# Patient Record
Sex: FEMALE | Race: WHITE | NOT HISPANIC OR LATINO | Employment: FULL TIME | URBAN - METROPOLITAN AREA
[De-identification: names, ages, dates, MRNs, and addresses within clinical notes are randomized per-mention and may not be internally consistent; named-entity substitution may affect disease eponyms.]

---

## 2023-07-17 ENCOUNTER — OFFICE VISIT (OUTPATIENT)
Dept: PRIMARY CARE | Facility: CLINIC | Age: 43
End: 2023-07-17
Payer: COMMERCIAL

## 2023-07-17 VITALS
OXYGEN SATURATION: 98 % | SYSTOLIC BLOOD PRESSURE: 110 MMHG | HEART RATE: 76 BPM | WEIGHT: 197 LBS | RESPIRATION RATE: 16 BRPM | TEMPERATURE: 98.3 F | BODY MASS INDEX: 39.79 KG/M2 | DIASTOLIC BLOOD PRESSURE: 76 MMHG

## 2023-07-17 DIAGNOSIS — N61.1 BREAST ABSCESS: Primary | ICD-10-CM

## 2023-07-17 PROCEDURE — 99213 OFFICE O/P EST LOW 20 MIN: CPT | Performed by: FAMILY MEDICINE

## 2023-07-17 RX ORDER — FLUCONAZOLE 150 MG/1
150 TABLET ORAL ONCE
Qty: 2 TABLET | Refills: 0 | Status: SHIPPED | OUTPATIENT
Start: 2023-07-17 | End: 2023-07-17

## 2023-07-17 RX ORDER — SULFAMETHOXAZOLE AND TRIMETHOPRIM 800; 160 MG/1; MG/1
1 TABLET ORAL 2 TIMES DAILY
Qty: 20 TABLET | Refills: 0 | Status: SHIPPED | OUTPATIENT
Start: 2023-07-17 | End: 2023-07-27

## 2023-07-17 NOTE — PATIENT INSTRUCTIONS
Today we have addressed your breast abscess  I have prescribed abx and a diflucan should you develop a yeast infection.     Follow up if no improvement or if symptoms worsen.

## 2023-07-17 NOTE — PROGRESS NOTES
Subjective   Patient ID: Che Arcos is a 43 y.o. female who presents for Breast Problem (Abscess/Boil on right breast - onset months ago ).    She states that she has a boil that has been there for awhile.  She has had them in the past. It is not tender or painful.  There is some fluid coming from the abscess. She has tried hot compresses.  She isn't allergic to any antibiotics.  She is not nursing currently.  Last mammogram was about a year ago.      Breast Problem       Review of Systems    Objective   /76   Pulse 76   Temp 36.8 °C (98.3 °F)   Resp 16   Wt 89.4 kg (197 lb)   LMP 06/19/2023 (Approximate)   SpO2 98%   BMI 39.79 kg/m²     Physical Exam  Constitutional:       Appearance: Normal appearance.   Chest:          Comments: Small non draining fluid filled palpable cyst in the lower medial quadrant of the right breast  Neurological:      Mental Status: She is alert.         Assessment/Plan

## 2023-08-11 ENCOUNTER — OFFICE VISIT (OUTPATIENT)
Dept: PRIMARY CARE | Facility: CLINIC | Age: 43
End: 2023-08-11
Payer: COMMERCIAL

## 2023-08-11 VITALS
OXYGEN SATURATION: 98 % | TEMPERATURE: 98 F | DIASTOLIC BLOOD PRESSURE: 66 MMHG | SYSTOLIC BLOOD PRESSURE: 104 MMHG | RESPIRATION RATE: 16 BRPM | HEIGHT: 59 IN | HEART RATE: 93 BPM | BODY MASS INDEX: 39.31 KG/M2 | WEIGHT: 195 LBS

## 2023-08-11 DIAGNOSIS — R06.83 SNORING: ICD-10-CM

## 2023-08-11 DIAGNOSIS — Z12.31 BREAST CANCER SCREENING BY MAMMOGRAM: ICD-10-CM

## 2023-08-11 DIAGNOSIS — N60.01 BREAST CYST, RIGHT: ICD-10-CM

## 2023-08-11 DIAGNOSIS — Z12.4 CERVICAL CANCER SCREENING: ICD-10-CM

## 2023-08-11 DIAGNOSIS — Z00.00 HEALTHCARE MAINTENANCE: Primary | ICD-10-CM

## 2023-08-11 DIAGNOSIS — E66.9 OBESITY (BMI 35.0-39.9 WITHOUT COMORBIDITY): ICD-10-CM

## 2023-08-11 PROCEDURE — 4004F PT TOBACCO SCREEN RCVD TLK: CPT | Performed by: FAMILY MEDICINE

## 2023-08-11 PROCEDURE — 99213 OFFICE O/P EST LOW 20 MIN: CPT | Performed by: FAMILY MEDICINE

## 2023-08-11 PROCEDURE — 99396 PREV VISIT EST AGE 40-64: CPT | Performed by: FAMILY MEDICINE

## 2023-08-11 ASSESSMENT — PATIENT HEALTH QUESTIONNAIRE - PHQ9
SUM OF ALL RESPONSES TO PHQ9 QUESTIONS 1 AND 2: 0
2. FEELING DOWN, DEPRESSED OR HOPELESS: NOT AT ALL
1. LITTLE INTEREST OR PLEASURE IN DOING THINGS: NOT AT ALL

## 2023-08-11 ASSESSMENT — PROMIS GLOBAL HEALTH SCALE
RATE_PHYSICAL_HEALTH: VERY GOOD
RATE_AVERAGE_FATIGUE: MILD
RATE_SOCIAL_SATISFACTION: GOOD
RATE_AVERAGE_PAIN: 0
RATE_MENTAL_HEALTH: GOOD
CARRYOUT_PHYSICAL_ACTIVITIES: COMPLETELY
CARRYOUT_SOCIAL_ACTIVITIES: GOOD
RATE_QUALITY_OF_LIFE: VERY GOOD
EMOTIONAL_PROBLEMS: RARELY
RATE_GENERAL_HEALTH: VERY GOOD

## 2023-08-11 NOTE — PROGRESS NOTES
"Subjective   Patient ID: Che Arcos is a 43 y.o. female who presents for Annual Exam.    Dentist and Eye Doctor appointments: UTD in dentist/ eye   Immunizations: UTD  Diet: She does well and then at 6pm she snacks after dinner, she does eat during the day though.    Exercise: walking 2 miles 2 to 3 times a week  Supplements: None   Menstrual Cycles: Regular  Sexually Active: Yes, Male, vasectomy  Pregnancy History:   Cancer Screening:    Cervical: 2022 wnl, negative hpv   Breast: DUE   Colon: due next year    Skin:wears sunscreen   DEXA:N/A       HPI     Review of Systems    Objective   /66   Pulse 93   Temp 36.7 °C (98 °F)   Resp 16   Ht 1.499 m (4' 11\")   Wt 88.5 kg (195 lb)   LMP 2023 (Approximate) Comment: last pap 2022 PCP  SpO2 98%   BMI 39.39 kg/m²     Physical Exam  Constitutional:       General: She is not in acute distress.     Appearance: She is well-developed. She is not diaphoretic.   HENT:      Head: Normocephalic and atraumatic.      Right Ear: Tympanic membrane normal.      Left Ear: Tympanic membrane normal.      Nose: Nose normal.      Mouth/Throat:      Mouth: Mucous membranes are moist.   Eyes:      General: No scleral icterus.     Pupils: Pupils are equal, round, and reactive to light.   Neck:      Thyroid: No thyromegaly.      Vascular: No JVD.   Cardiovascular:      Rate and Rhythm: Normal rate and regular rhythm.      Heart sounds: Normal heart sounds. No murmur heard.     No friction rub. No gallop.   Pulmonary:      Effort: Pulmonary effort is normal. No respiratory distress.      Breath sounds: Normal breath sounds. No wheezing or rales.   Chest:      Chest wall: No tenderness.   Abdominal:      General: Bowel sounds are normal. There is no distension.      Palpations: Abdomen is soft. There is no mass.      Tenderness: There is no abdominal tenderness. There is no rebound.   Musculoskeletal:         General: Normal range of motion.      Cervical back: " Normal range of motion and neck supple.   Lymphadenopathy:      Cervical: No cervical adenopathy.   Skin:     General: Skin is warm and dry.   Neurological:      General: No focal deficit present.      Mental Status: She is alert and oriented to person, place, and time.      Deep Tendon Reflexes: Reflexes normal.   Psychiatric:         Mood and Affect: Mood normal.         Behavior: Behavior normal.         Assessment/Plan   Diagnoses and all orders for this visit:  Healthcare maintenance  -     CBC; Future  -     Comprehensive Metabolic Panel; Future  -     Lipid Panel; Future  -     Vitamin D 25 hydroxy; Future  -     TSH with reflex to Free T4 if abnormal; Future  Cervical cancer screening  Comments:  5/2022- wnl,negative hpv  Breast cancer screening by mammogram  -     BI mammo bilateral screening tomosynthesis; Future  Snoring  -     Referral to Adult Sleep Medicine; Future  Breast cyst, right  -     Referral to Breast Surgery; Future  Obesity (BMI 35.0-39.9 without comorbidity)  -     Referral to Comprehensive Weight Loss; Future  -     Referral to Nutrition Services; Future

## 2023-08-11 NOTE — PATIENT INSTRUCTIONS
Today we performed your Annual Physical Exam.  Your preventative health care, labs and vaccinations have been reviewed and are up to date.      Your vaccines are up to date.    I have referred you to sleep medicine for your snoring/sleep study    I have referred you to comprehensive weight loss/nutrition for healthy eating habits and weight loss    For your breast cyst I have referred you to a breast surgeon    Follow up in 1 year for your Complete Physical Exam

## 2023-09-25 PROBLEM — E04.1 THYROID NODULE: Status: ACTIVE | Noted: 2018-12-07

## 2023-09-25 PROBLEM — R63.5 WEIGHT GAIN: Status: ACTIVE | Noted: 2023-09-25

## 2023-09-25 PROBLEM — E55.9 HYPOVITAMINOSIS D: Status: ACTIVE | Noted: 2023-09-25

## 2023-09-25 PROBLEM — Z12.4 PAP SMEAR FOR CERVICAL CANCER SCREENING: Status: ACTIVE | Noted: 2023-09-25

## 2023-09-25 PROBLEM — L72.3 SEBACEOUS CYST: Status: ACTIVE | Noted: 2023-09-25

## 2023-10-02 ENCOUNTER — OFFICE VISIT (OUTPATIENT)
Dept: SURGICAL ONCOLOGY | Facility: CLINIC | Age: 43
End: 2023-10-02
Payer: COMMERCIAL

## 2023-10-02 VITALS
HEIGHT: 59 IN | SYSTOLIC BLOOD PRESSURE: 111 MMHG | WEIGHT: 195 LBS | HEART RATE: 83 BPM | DIASTOLIC BLOOD PRESSURE: 85 MMHG | BODY MASS INDEX: 39.31 KG/M2

## 2023-10-02 DIAGNOSIS — L72.3 SEBACEOUS CYST: Primary | ICD-10-CM

## 2023-10-02 PROCEDURE — 4004F PT TOBACCO SCREEN RCVD TLK: CPT | Performed by: SURGERY

## 2023-10-02 PROCEDURE — 99203 OFFICE O/P NEW LOW 30 MIN: CPT | Performed by: SURGERY

## 2023-10-02 PROCEDURE — 99213 OFFICE O/P EST LOW 20 MIN: CPT | Performed by: SURGERY

## 2023-10-02 NOTE — H&P (VIEW-ONLY)
BREAST SURGERY NEW PATIENT    Subjective   Che Arcos is a 43 y.o. female who presents for  sebaceous cyst of the right breast skin.    Mass palpated on clinical breast exam prompting diagnostic imaging.    Bilateral mammogram and right breast U/S on 9/8/2023 reveal a benign sebaceous cyst at 4:00 10cmFN measuring 1.3cm.    She notes recurrent drainage from the area for the past 6 months.    Menarche: 10  AFLB: 30  Menopause: no  HRT: no  Previous biopsies: No  Previous breast surgery: Yes - bilateral breast reduction  Prior breast cancer: No    Family history: negative.    Review of Systems   Constitutional symptoms: Denies generalized fatigue.  Denies weight change, fevers/chills, difficulty sleeping   Eyes: Denies double vision, glaucoma, cataracts.  Ear/nose/throat/mouth: Denies hearing changes, sore throat, sinus problems.  Cardiovascular: No chest pain.  Denies irregular heartbeat.  Denies ankle swelling.  Respiratory: No wheezing, cough, or shortness of breath.  Gastrointestinal: No abdominal pain,  No nausea/vomiting.  No indigestion/heartburn.  No change in bowel habits.  No constipation or diarrhea.   Genitourinary: No urinary incontinence.  No urinary frequency.  No painful urination.  Musculoskeletal: No bone pain, no muscle pain, no joint pain.   Integumentary: No rash. No masses.  No changes in moles.  No easy bruising.  Neurological: No headaches.  No tremors. No numbness/tingling.  Psychiatric: No anxiety. No depression.  Endocrine: No excessive thirst.  Not too hot or too cold.  Not tired or fatigued.    Hematological/lymphatic: No swollen glands or blood clotting problems.  No bruising.    Objective   Physical Exam  General: Alert and oriented x 3.  Mood and affect are appropriate.  HEENT: EOMI, PERRLA.   Neck: supple, no masses, no cervical adenopathy.  Cardiovascular: no lower extremity edema.  Pulmonary: breathing non labored on room air.  GI: Abdomen soft, no masses. No hepatomegaly or  splenomegaly.  Lymph nodes: No supraclavicular or axillary adenopathy bilaterally.  Musculoskeletal: Full range of motion in the upper extremities bilaterally.  Neuro: denies dizziness, tremors    Breasts: The breasts were examined in both the seated and supine positions.    RIGHT: The nipple is everted without nipple discharge.  There are no skin changes, skin thickening, or dimpling. There are 2 adjacent masses just superior to the reduction pattern IMF incision.  One lesion does have some overlying redness the more medial mass does not.     LEFT: The nipple is everted without nipple discharge.  There are no skin changes, skin thickening, or dimpling. There are no masses palpated in the LEFT breast.     Radiology review: All images and reports were personally reviewed.     Assessment/Plan     Right breast sebaceous cyst at 4:00 10cmFN measuring 1.3cm  Surgical excision can be offered if symptomatic or recurring infections.  Given recurring symptoms I do think she would benefit from surgical excision.   Offered surgical date of 10/20/2023 for right breast excisional biopsy.          Ynes Benavides, DO

## 2023-10-04 ENCOUNTER — NUTRITION (OUTPATIENT)
Dept: PRIMARY CARE | Facility: CLINIC | Age: 43
End: 2023-10-04
Payer: COMMERCIAL

## 2023-10-04 DIAGNOSIS — E66.9 OBESITY (BMI 35.0-39.9 WITHOUT COMORBIDITY): Primary | ICD-10-CM

## 2023-10-04 PROCEDURE — 97802 MEDICAL NUTRITION INDIV IN: CPT | Performed by: DIETITIAN, REGISTERED

## 2023-10-04 NOTE — PROGRESS NOTES
Assessment     Reason for Visit:  Che Arcos is a 43 y.o. female who presents for Nutrition Counseling and Weight Management    Food And Nutrient Intake:  Food and Nutrient History  Food and Nutrient History: Patient is here to discuss weight loss. She feels that her breakfast and lunches at home are healthy, but later in the day her eating habits are not as balanced. She lives with 3 children and her . 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  Fluid Intake: coffee in the morning and an iced coffee in the afternoon, water w/ flavor, sometimes soda w/ fast food. Wine, sometimes a few glasses.  Food Allergy: none  Food Intolerance: greens (spring mix, spinach) although it doesn't deter her from eating it  GI Symptoms: none  Oral Problems: denies  Dentition: own  Sleep Duration/Quality:  (sleep study pending)     Food Intake  Meal 1: B: coffee w/ half and half, 1-1.5 hours later a couple eggs, [employer]  Meal 2: L: soup, salad or frozen  [place]'s meal when at home. Fast food when she's traveling mid-day, she would rather not make this choice.  Meal 3: D: the kids have certain meals they like - tacos, spaghetti & meatballs, noodles, ground turkey. She prefers more diversity of meals, so sometimes she doesn't eat dinner. She recently bought a [employer] cookbook to try new recipes. Sometimes has wine with/instead of dinner.  Snacks: pm S: chips, popcorn, cheese. Sometimes later at night eats a snack if she didn't eat dinner, but she denies that eating in the evening is problematic.    Food Preparation  Cooking: Patient  Grocery Shopping: Patient    Food And Nutrient Administration:    Diet Experience  Previous Diet / Nutrition Education / Counseling: She lost 30# about 7 years ago when following a 1200 kcal diet and going to [facility]. She had more time available at that time, could drop the kids at the  childcare center at the gym.  Dieting Attempts: She reports she has been on a variety of diets over the years, none of which were sustainable long-term.    Eating Environment  Atmosphere:  (Dinners are hectic, not very relaxing due to lack of time since the kids have activities)    Physical Activities:  Physical Activity  Physical Activity History: [place] about 7 years ago, she liked it at the time.  Consistency: No  Type of Physical Activity: She goes for walks and uses [employer] bike, both are intermittent because lack of time is a barrier to exercise. She doesn't enjoy the bike, she prefers walking. She is interested in becoming more physically active.    Knowledge Beliefs Attitudes and Behavior  Food and Nutrition Knowledge  Area(s) and Level of Knowledge/Skill: She has learned about nutrition from prior diets, also has seen ideas on TikTok    Avoidance Behavior  Avoidance: Specific foods  Cause of Avoidance Behavior: She avoids some foods because she feels that they are bad for her, or she intermittently eats them but feels negative feelings about choosing them, such as chips or a high-fat yogurt.    Diagnosis   Malnutrition Diagnosis  Patient has Malnutrition Diagnosis: No  Nutrition Diagnosis  Patient has Nutrition Diagnosis: Yes  Diagnosis Status (1): New  Nutrition Diagnosis 1: Food and nutrition related knowledge deficit  Related to (1): exposure to a variety of nutrition-related education but she is not sure which rules are worthy of following  As Evidenced by (1): patient has questions about changing her diet to promote weight loss  Additional Nutrition Diagnosis: Diagnosis 2  Diagnosis Status (2): New  Nutrition Diagnosis 2: Physicial inactivity  Related to (2): lack of time, and perhaps other reasons such as lack of positive feedback from her body when she does choose to exercise  As Evidenced by (2): she does not currently have a regular routine of being physically active    Interventions/Recommendations    Food and Nutrition Delivery  Meals & Snacks: Modify Composition of Meals/Snacks, Modify schedule of foods/fluids, Other, specify: (work on improving relationship with food/consider IE)  Nutrition Education  Nutrition Education Content: Content related nutrition education, Physical activity guidance  Nutrition Counseling  Theoretical Basis/Approach: Nutrition counseling based on health belief model  Goals: HBM: explored perceived barriers/benefits of exercise.    Patient Instructions   1. Encouraged patient to pursue balanced eating.   Continue to start the day with a meal. Be attentive to the first time of the day that there are signs of hunger appearing, and respond to those hunger signals by eating a meal or snack.   Eat food at least every 5 hours. If it will be longer than 5 hours between meals, consider adding a snack between the meals. Eating at regular intervals can help prevent becoming overly hungry.   Use the Plate Method to balance the type of food and amount of foods on the plate.   At snacks, include a food that is a rich source of protein, and include a food from a second food group. Eating food from 2 food groups at a snack can promote more satisfaction than eating a snack that is very small. A snack should be satisfying and help diminish thoughts of hunger until the next meal.   Spend some time each week planning out what to eat, shop for groceries, and do a little food preparation. Try to create meals that will yield leftovers to save time at another meal. Planning can be one of the most helpful skills for promoting balanced eating.    Strive to make pleasure part of eating healthy. Also strive for a good relationship with food. This shouldn't be a temporary diet that can only be maintained with strict willpower. Be curious about what tastes good to you and what foods feel good in your body. No food is inherently good or bad, and eating shouldn't promote negative feelings about food. Be kind to  yourself as you work on balanced eating.    2. Utilize the Plate Method at meals in order to balance meals.   - 1/3-1/2 of the plate full of non-starchy vegetables. These foods contribute very little carbohydrate to the plate, and they add fiber to the meal. Salad, carrots, bell peppers, zucchini, broccoli, cauliflower, asparagus, radishes, carrots and green beans are a few examples of these foods. They can be served cooked or raw.    - 1/4-1/3 of the plate including protein foods. Examples can include meat, nuts, seeds, cheese, cottage cheese, nut butter, fish, seafood, tofu, and edamame.     - 1/4-1/3 of the plate including carbohydrate foods. These foods include grains, fruit, legumes, starchy vegetables, milk and yogurt. Aim to eat at least half of the daily grains as whole grains.     3. Introduced patient to the concept of Intuitive Eating. Explained that diet culture generates food rules that are not helpful at guiding decisions about balanced eating. Food rules from diet culture are not typically realistic to follow long-term and they can promote a negative relationship with food. Encouraged patient to consider becoming more attuned to body signals about hunger, and to respond to hunger by choosing foods that are likely to satisfy. Encouraged eating meals at regular intervals through the day, and creating meals that include foods from a diversity of food groups.     4. Explored the idea of exercise with her, talked about prior exercise experiences and encouraged her to think about what type of exercises she might enjoy at this point in her life. Encouraged her to try out being physically active in small ways, such as getting a 15-20 minute walk on a regular basis. Also emphasized there is a large diversity of types of exercise, so she should try out what type might resonate most with her. She has been thinking about lifting weights - encouraged her to follow through with this idea.     Monitoring and  "Evaluation   Food/Nutrient Related History Monitoring  Monitoring and Evaluation Plan: Amount of food, Meal/snack pattern  Amount of Food: Types of food, Estimated amout of food  Criteria: She will use the Plate Method to guide amount/type of food at meals  Meal/Snack Pattern: Estimated meal and snack pattern  Criteria: She will practice packing snack/wrap/meal to try pre-emptively eating so that she doesn't become overly hungry mid-day and purchase fast food  Knowledge/Beliefs/Attitudes  Monitoring and Evaluation Plan: Beliefs and attitudes, Avoidance behavior  Criteria: She will begin trying to evaluate whether \"food rules\" are serving her well or are barriers to eating with a good relationship to food.  Avoidance behavior: Avoidance  Criteria: She will challenge some of her ideas about foods that she should be avoiding, and see what the experiences are like to try eating them    Time Spent  Prep time on day of patient encounter: 5 minutes  Time spent directly with patient, family or caregiver: 60 minutes  Additional Time Spent on Patient Care Activities: 0 minutes  Documentation Time: 20 minutes  Other Time Spent: 0 minutes  Total: 85 minutes      Readiness to Change : Good  Level of Understanding : Good  Anticipated Compliant : Good    "

## 2023-10-04 NOTE — PATIENT INSTRUCTIONS
1. Encouraged patient to pursue balanced eating.   Continue to start the day with a meal. Be attentive to the first time of the day that there are signs of hunger appearing, and respond to those hunger signals by eating a meal or snack.   Eat food at least every 5 hours. If it will be longer than 5 hours between meals, consider adding a snack between the meals. Eating at regular intervals can help prevent becoming overly hungry.   Use the Plate Method to balance the type of food and amount of foods on the plate.   At snacks, include a food that is a rich source of protein, and include a food from a second food group. Eating food from 2 food groups at a snack can promote more satisfaction than eating a snack that is very small. A snack should be satisfying and help diminish thoughts of hunger until the next meal.   Spend some time each week planning out what to eat, shop for groceries, and do a little food preparation. Try to create meals that will yield leftovers to save time at another meal. Planning can be one of the most helpful skills for promoting balanced eating.    Strive to make pleasure part of eating healthy. Also strive for a good relationship with food. This shouldn't be a temporary diet that can only be maintained with strict willpower. Be curious about what tastes good to you and what foods feel good in your body. No food is inherently good or bad, and eating shouldn't promote negative feelings about food. Be kind to yourself as you work on balanced eating.    2. Utilize the Plate Method at meals in order to balance meals.   - 1/3-1/2 of the plate full of non-starchy vegetables. These foods contribute very little carbohydrate to the plate, and they add fiber to the meal. Salad, carrots, bell peppers, zucchini, broccoli, cauliflower, asparagus, radishes, carrots and green beans are a few examples of these foods. They can be served cooked or raw.    - 1/4-1/3 of the plate including protein foods.  Examples can include meat, nuts, seeds, cheese, cottage cheese, nut butter, fish, seafood, tofu, and edamame.     - 1/4-1/3 of the plate including carbohydrate foods. These foods include grains, fruit, legumes, starchy vegetables, milk and yogurt. Aim to eat at least half of the daily grains as whole grains.     3. Introduced patient to the concept of Intuitive Eating. Explained that diet culture generates food rules that are not helpful at guiding decisions about balanced eating. Food rules from diet culture are not typically realistic to follow long-term and they can promote a negative relationship with food. Encouraged patient to consider becoming more attuned to body signals about hunger, and to respond to hunger by choosing foods that are likely to satisfy. Encouraged eating meals at regular intervals through the day, and creating meals that include foods from a diversity of food groups.     4. Explored the idea of exercise with her, talked about prior exercise experiences and encouraged her to think about what type of exercises she might enjoy at this point in her life. Encouraged her to try out being physically active in small ways, such as getting a 15-20 minute walk on a regular basis. Also emphasized there is a large diversity of types of exercise, so she should try out what type might resonate most with her. She has been thinking about lifting weights - encouraged her to follow through with this idea.

## 2023-10-05 ENCOUNTER — APPOINTMENT (OUTPATIENT)
Dept: PREADMISSION TESTING | Facility: HOSPITAL | Age: 43
End: 2023-10-05
Payer: COMMERCIAL

## 2023-10-06 ENCOUNTER — APPOINTMENT (OUTPATIENT)
Dept: PREADMISSION TESTING | Facility: HOSPITAL | Age: 43
End: 2023-10-06
Payer: COMMERCIAL

## 2023-10-12 ENCOUNTER — CLINICAL SUPPORT (OUTPATIENT)
Dept: PREADMISSION TESTING | Facility: HOSPITAL | Age: 43
End: 2023-10-12
Payer: COMMERCIAL

## 2023-10-12 ENCOUNTER — LAB (OUTPATIENT)
Dept: LAB | Facility: LAB | Age: 43
End: 2023-10-12
Payer: COMMERCIAL

## 2023-10-12 VITALS
DIASTOLIC BLOOD PRESSURE: 69 MMHG | HEIGHT: 59 IN | BODY MASS INDEX: 40.31 KG/M2 | SYSTOLIC BLOOD PRESSURE: 116 MMHG | RESPIRATION RATE: 16 BRPM | WEIGHT: 199.96 LBS | HEART RATE: 64 BPM | TEMPERATURE: 97 F

## 2023-10-12 DIAGNOSIS — Z01.818 PRE-OP TESTING: Primary | ICD-10-CM

## 2023-10-12 DIAGNOSIS — L72.3 SEBACEOUS CYST: ICD-10-CM

## 2023-10-12 DIAGNOSIS — N60.81 SEBACEOUS CYST OF SKIN OF RIGHT BREAST: ICD-10-CM

## 2023-10-12 LAB
ALBUMIN SERPL BCP-MCNC: 4.3 G/DL (ref 3.4–5)
ALP SERPL-CCNC: 60 U/L (ref 33–110)
ALT SERPL W P-5'-P-CCNC: 13 U/L (ref 7–45)
ANION GAP SERPL CALC-SCNC: 10 MMOL/L (ref 10–20)
AST SERPL W P-5'-P-CCNC: 14 U/L (ref 9–39)
BILIRUB SERPL-MCNC: 0.4 MG/DL (ref 0–1.2)
BUN SERPL-MCNC: 12 MG/DL (ref 6–23)
CALCIUM SERPL-MCNC: 9 MG/DL (ref 8.6–10.3)
CHLORIDE SERPL-SCNC: 103 MMOL/L (ref 98–107)
CO2 SERPL-SCNC: 25 MMOL/L (ref 21–32)
CREAT SERPL-MCNC: 0.8 MG/DL (ref 0.5–1.05)
ERYTHROCYTE [DISTWIDTH] IN BLOOD BY AUTOMATED COUNT: 12.6 % (ref 11.5–14.5)
GFR SERPL CREATININE-BSD FRML MDRD: >90 ML/MIN/1.73M*2
GLUCOSE SERPL-MCNC: 97 MG/DL (ref 74–99)
HCT VFR BLD AUTO: 42.4 % (ref 36–46)
HGB BLD-MCNC: 13.9 G/DL (ref 12–16)
MCH RBC QN AUTO: 30.5 PG (ref 26–34)
MCHC RBC AUTO-ENTMCNC: 32.8 G/DL (ref 32–36)
MCV RBC AUTO: 93 FL (ref 80–100)
NRBC BLD-RTO: 0 /100 WBCS (ref 0–0)
PLATELET # BLD AUTO: 271 X10*3/UL (ref 150–450)
PMV BLD AUTO: 11.1 FL (ref 7.5–11.5)
POTASSIUM SERPL-SCNC: 4.3 MMOL/L (ref 3.5–5.3)
PROT SERPL-MCNC: 6.8 G/DL (ref 6.4–8.2)
RBC # BLD AUTO: 4.55 X10*6/UL (ref 4–5.2)
SODIUM SERPL-SCNC: 134 MMOL/L (ref 136–145)
WBC # BLD AUTO: 7.5 X10*3/UL (ref 4.4–11.3)

## 2023-10-12 PROCEDURE — 80053 COMPREHEN METABOLIC PANEL: CPT

## 2023-10-12 PROCEDURE — 36415 COLL VENOUS BLD VENIPUNCTURE: CPT

## 2023-10-12 PROCEDURE — 99202 OFFICE O/P NEW SF 15 MIN: CPT | Performed by: NURSE PRACTITIONER

## 2023-10-12 PROCEDURE — 85027 COMPLETE CBC AUTOMATED: CPT

## 2023-10-12 ASSESSMENT — PAIN SCALES - GENERAL: PAINLEVEL_OUTOF10: 0 - NO PAIN

## 2023-10-12 ASSESSMENT — ENCOUNTER SYMPTOMS
PSYCHIATRIC NEGATIVE: 1
RESPIRATORY NEGATIVE: 1
ROS SKIN COMMENTS: SEE HPI
HEMATOLOGIC/LYMPHATIC NEGATIVE: 1
NEUROLOGICAL NEGATIVE: 1
EYES NEGATIVE: 1
MUSCULOSKELETAL NEGATIVE: 1
CARDIOVASCULAR NEGATIVE: 1
GASTROINTESTINAL NEGATIVE: 1
ENDOCRINE NEGATIVE: 1
CONSTITUTIONAL NEGATIVE: 1

## 2023-10-12 ASSESSMENT — DUKE ACTIVITY SCORE INDEX (DASI)
CAN YOU WALK INDOORS, SUCH AS AROUND YOUR HOUSE: YES
CAN YOU PARTICIPATE IN STRENOUS SPORTS LIKE SWIMMING, SINGLES TENNIS, FOOTBALL, BASKETBALL, OR SKIING: NO
CAN YOU DO YARD WORK LIKE RAKING LEAVES, WEEDING OR PUSHING A MOWER: YES
CAN YOU WALK A BLOCK OR TWO ON LEVEL GROUND: YES
CAN YOU PARTICIPATE IN MODERATE RECREATIONAL ACTIVITIES LIKE GOLF, BOWLING, DANCING, DOUBLES TENNIS OR THROWING A BASEBALL OR FOOTBALL: YES
CAN YOU DO LIGHT WORK AROUND THE HOUSE LIKE DUSTING OR WASHING DISHES: YES
CAN YOU DO HEAVY WORK AROUND THE HOUSE LIKE SCRUBBING FLOORS OR LIFTING AND MOVING HEAVY FURNITURE: YES
CAN YOU TAKE CARE OF YOURSELF (EAT, DRESS, BATHE, OR USE TOILET): YES
TOTAL_SCORE: 50.7
CAN YOU CLIMB A FLIGHT OF STAIRS OR WALK UP A HILL: YES
CAN YOU DO MODERATE WORK AROUND THE HOUSE LIKE VACUUMING, SWEEPING FLOORS OR CARRYING GROCERIES: YES
CAN YOU HAVE SEXUAL RELATIONS: YES
DASI METS SCORE: 9
CAN YOU RUN A SHORT DISTANCE: YES

## 2023-10-12 ASSESSMENT — PAIN - FUNCTIONAL ASSESSMENT: PAIN_FUNCTIONAL_ASSESSMENT: 0-10

## 2023-10-12 ASSESSMENT — CHADS2 SCORE
CHADS2 SCORE: 0
AGE GREATER THAN OR EQUAL TO 75: NO
PRIOR STROKE OR TIA OR THROMBOEMBOLISM: NO
CHF: NO
DIABETES: NO
HYPERTENSION: NO

## 2023-10-12 ASSESSMENT — ACTIVITIES OF DAILY LIVING (ADL): ADL_SCORE: 0

## 2023-10-12 NOTE — CPM/PAT H&P
CPM/PAT Evaluation       Name: Che Arcos (Che Arcos)  /Age: 1980/43 y.o.     In-Person       Chief Complaint: sebaceous cyst    HPI This is a 44 yo with complaint of right breast sebaceous cyst.  She had completed a course of antibiotic therapy over the summer.  The cyst is still present and draining scant serosanguinous drainage.  Pt denies recent fever or chills.    Past Medical History:   Diagnosis Date    Anxiety     Sebaceous cyst of breast        Past Surgical History:   Procedure Laterality Date    BREAST SURGERY  2006    Reduction     SECTION, LOW TRANSVERSE  7/25/14    x2       Patient  reports being sexually active and has had partner(s) who are male. She reports using the following method of birth control/protection: Male Sterilization.    Family History   Problem Relation Name Age of Onset    Depression Mother Erica Aj     Hearing loss Mother Erica Aj     Hyperlipidemia Mother Erica Aj     No Known Problems Father      No Known Problems Brother      No Known Problems Daughter      No Known Problems Son      No Known Problems Son         No Known Allergies    Prior to Admission medications    Not on File        Review of Systems   Constitutional: Negative.    HENT: Negative.     Eyes: Negative.    Respiratory: Negative.     Cardiovascular: Negative.    Gastrointestinal: Negative.    Endocrine: Negative.    Genitourinary: Negative.    Musculoskeletal: Negative.    Skin:         See HPI   Neurological: Negative.    Hematological: Negative.    Psychiatric/Behavioral: Negative.          Physical Exam  Constitutional:       Appearance: Normal appearance.   HENT:      Head: Normocephalic and atraumatic.      Nose: Nose normal.      Mouth/Throat:      Mouth: Mucous membranes are moist.   Eyes:      Pupils: Pupils are equal, round, and reactive to light.   Cardiovascular:      Rate and Rhythm: Normal rate and regular rhythm.   Pulmonary:      Effort: Pulmonary effort is normal.       Breath sounds: Normal breath sounds.   Abdominal:      General: Bowel sounds are normal.      Palpations: Abdomen is soft.   Musculoskeletal:         General: Normal range of motion.      Cervical back: Normal range of motion.   Skin:     General: Skin is warm and dry.   Neurological:      General: No focal deficit present.      Mental Status: She is alert and oriented to person, place, and time.   Psychiatric:         Mood and Affect: Mood normal.         Behavior: Behavior normal.      Patient denies any anesthesia complications.    PAT AIRWAY:   Airway:     Neck ROM::  Full  Teeth intact    Visit Vitals  /69   Pulse 64   Temp 36.1 °C (97 °F) (Temporal)   Resp 16     Lab Results   Component Value Date    GLUCOSE 97 10/12/2023    CALCIUM 9.0 10/12/2023     (L) 10/12/2023    K 4.3 10/12/2023    CO2 25 10/12/2023     10/12/2023    BUN 12 10/12/2023    CREATININE 0.80 10/12/2023      Lab Results   Component Value Date    WBC 7.5 10/12/2023    HGB 13.9 10/12/2023    HCT 42.4 10/12/2023    MCV 93 10/12/2023     10/12/2023      DASI Risk Score    No data to display       Caprini DVT Assessment    No data to display       Modified Frailty Index    No data to display       CHADS2 Stroke Risk  Current as of a minute ago        N/A 3 - 100%: High Risk   2 - 3%: Medium Risk   0 - 2%: Low Risk     Last Change: N/A          This score determines the patient's risk of having a stroke if the patient has atrial fibrillation.        This score is not applicable to this patient. Components are not calculated.          Revised Cardiac Risk Index    No data to display       Apfel Simplified Score    No data to display       Risk Analysis Index Results This Encounter    No data found in the last 1 encounters.         Assessment and Plan:     Plan for OR on 10/20  Urine HCG  CMP  CBC

## 2023-10-12 NOTE — PREPROCEDURE INSTRUCTIONS
Medication List      as of October 12, 2023  1:56 PM     You have not been prescribed any medications.         PRE-OPERATIVE INSTRUCTIONS    You will receive notification one business day prior to your surgery to confirm your arrival time and additional information. It is important that you answer your phone and/or check your messages during this time.    Please enter the building through the Outpatient entrance. Take the elevator off the lobby to the 2nd floor and check in at the Outpatient Surgery desk    INSTRUCTIONS:  Talk to your surgeon for instructions if you should stop your aspirin, blood thinner, or diabetes medicines.  DO NOT take any multivitamins or over the counter supplements for 7-10 days before surgery.  If not being admitted, you must have an adult immediately available to drive you home after surgery. We also highly recommend you have someone stay with you for the entire day and night of your surgery.  For children having surgery, a parent or legal guardian must accompany t hem to the surgery center. If this is not possible, please call [phone] to make additional arrangements.  For adults who are unable to consent or make medical decisions for themselves, a legal guardian or Power of  must accompany them to the surgery center. If this is not possible, please call [phone] to make additional arrangements.  Wear comfortable, loose fitting clothing.  All jewelry and piercings must be removed. If you are unable to remove an item or have a dermal piercing, please be sure to tell the nurse when you arrive for surgery.  Nail polish and make-up must be removed.  Avoid smoking or consuming alcohol for 24 hours before surgery.  To help prevent infection, please take a shower/bath and wash your hair the night before and/or morning of surgery.    Additional instructions about eating and drinking before surgery:  Do not eat any solid foods or drink anything but clear liquids within 6 hours  of your arrival time for surgery. Milk, nutritional drinks/supplements, and infant formula are considered solid foods.  You may drink clear liquids up to 2 hours before your arrival time for surgery, unless directed otherwise by your surgeon. Clear liquids include water, non-carbonated sports drinks (Gatorade), black tea or coffee (no creamers) and breast milk.    If you received a blue folder, please review additional information provided inside the folder regarding additional preparation.     If you have any questions or concerns, please call Pre-Admission Testing at [phone].      Preoperative Bathing instructions    Follow these instructions the evening before and morning of surgery:  Do not shave the day before or day of surgery.  Remove all jewelry until after surgery. Take off rings and take out all body-piercing jewelry.  Use a clean wash cloth and towel.  Wash your face and hair with your normal soap and shampoo before you use the CHG soap.  Use a wash cloth to clean your skin with the CHG soap. Use enough CHG soap to cover the skin on your whole body. Use the same amount as you would with your normal soap.  Do not use the CHG soap on your face, eyes, ears, or head.  Do not scrub your skin too hard.  Be sure to clean the area well where surgery will be done.  Do not wash with your normal soap after the CHG soap.  Keep away from the water stream when you put CHG soap on. This keeps it from rinsing off too soon.  Rinse your body well.  Pat yourself dry with a clean, soft towel.  Put on clean clothing.  Do not put on any deodorants, lotions, or oils after showering. These might block how the CHG soap works.

## 2023-10-20 ENCOUNTER — ANESTHESIA (OUTPATIENT)
Dept: OPERATING ROOM | Facility: HOSPITAL | Age: 43
End: 2023-10-20
Payer: COMMERCIAL

## 2023-10-20 ENCOUNTER — ANESTHESIA EVENT (OUTPATIENT)
Dept: OPERATING ROOM | Facility: HOSPITAL | Age: 43
End: 2023-10-20
Payer: COMMERCIAL

## 2023-10-20 ENCOUNTER — HOSPITAL ENCOUNTER (OUTPATIENT)
Facility: HOSPITAL | Age: 43
Setting detail: OUTPATIENT SURGERY
Discharge: HOME | End: 2023-10-20
Attending: SURGERY | Admitting: SURGERY
Payer: COMMERCIAL

## 2023-10-20 VITALS
DIASTOLIC BLOOD PRESSURE: 66 MMHG | HEIGHT: 59 IN | WEIGHT: 195 LBS | BODY MASS INDEX: 39.31 KG/M2 | HEART RATE: 71 BPM | TEMPERATURE: 97.2 F | OXYGEN SATURATION: 98 % | RESPIRATION RATE: 18 BRPM | SYSTOLIC BLOOD PRESSURE: 121 MMHG

## 2023-10-20 DIAGNOSIS — L72.3 SEBACEOUS CYST: ICD-10-CM

## 2023-10-20 PROBLEM — F41.9 ANXIETY: Status: ACTIVE | Noted: 2023-10-20

## 2023-10-20 LAB — HCG UR QL IA.RAPID: NEGATIVE

## 2023-10-20 PROCEDURE — 7100000002 HC RECOVERY ROOM TIME - EACH INCREMENTAL 1 MINUTE: Performed by: SURGERY

## 2023-10-20 PROCEDURE — 88304 TISSUE EXAM BY PATHOLOGIST: CPT | Performed by: PATHOLOGY

## 2023-10-20 PROCEDURE — 81025 URINE PREGNANCY TEST: CPT | Performed by: SURGERY

## 2023-10-20 PROCEDURE — 2500000005 HC RX 250 GENERAL PHARMACY W/O HCPCS: Performed by: SURGERY

## 2023-10-20 PROCEDURE — 0752T DGTZ GLS MCRSCP SLD LVL III: CPT | Mod: TC,SUR,STJLAB | Performed by: SURGERY

## 2023-10-20 PROCEDURE — 7100000010 HC PHASE TWO TIME - EACH INCREMENTAL 1 MINUTE: Performed by: SURGERY

## 2023-10-20 PROCEDURE — A19120 PR EXCISE BREAST CYST: Performed by: ANESTHESIOLOGY

## 2023-10-20 PROCEDURE — 7100000001 HC RECOVERY ROOM TIME - INITIAL BASE CHARGE: Performed by: SURGERY

## 2023-10-20 PROCEDURE — 88304 TISSUE EXAM BY PATHOLOGIST: CPT | Mod: TC,STJLAB | Performed by: SURGERY

## 2023-10-20 PROCEDURE — 3700000001 HC GENERAL ANESTHESIA TIME - INITIAL BASE CHARGE: Performed by: SURGERY

## 2023-10-20 PROCEDURE — 2500000004 HC RX 250 GENERAL PHARMACY W/ HCPCS (ALT 636 FOR OP/ED): Performed by: NURSE ANESTHETIST, CERTIFIED REGISTERED

## 2023-10-20 PROCEDURE — 3600000008 HC OR TIME - EACH INCREMENTAL 1 MINUTE - PROCEDURE LEVEL THREE: Performed by: SURGERY

## 2023-10-20 PROCEDURE — A19120 PR EXCISE BREAST CYST: Performed by: NURSE ANESTHETIST, CERTIFIED REGISTERED

## 2023-10-20 PROCEDURE — 2500000004 HC RX 250 GENERAL PHARMACY W/ HCPCS (ALT 636 FOR OP/ED): Performed by: SURGERY

## 2023-10-20 PROCEDURE — 2500000001 HC RX 250 WO HCPCS SELF ADMINISTERED DRUGS (ALT 637 FOR MEDICARE OP): Performed by: ANESTHESIOLOGY

## 2023-10-20 PROCEDURE — 2500000005 HC RX 250 GENERAL PHARMACY W/O HCPCS: Performed by: NURSE ANESTHETIST, CERTIFIED REGISTERED

## 2023-10-20 PROCEDURE — 7100000009 HC PHASE TWO TIME - INITIAL BASE CHARGE: Performed by: SURGERY

## 2023-10-20 PROCEDURE — 3600000003 HC OR TIME - INITIAL BASE CHARGE - PROCEDURE LEVEL THREE: Performed by: SURGERY

## 2023-10-20 PROCEDURE — 3700000002 HC GENERAL ANESTHESIA TIME - EACH INCREMENTAL 1 MINUTE: Performed by: SURGERY

## 2023-10-20 PROCEDURE — 19120 REMOVAL OF BREAST LESION: CPT | Performed by: SURGERY

## 2023-10-20 RX ORDER — DEXAMETHASONE SODIUM PHOSPHATE 4 MG/ML
INJECTION, SOLUTION INTRA-ARTICULAR; INTRALESIONAL; INTRAMUSCULAR; INTRAVENOUS; SOFT TISSUE AS NEEDED
Status: DISCONTINUED | OUTPATIENT
Start: 2023-10-20 | End: 2023-10-20

## 2023-10-20 RX ORDER — PROPOFOL 10 MG/ML
INJECTION, EMULSION INTRAVENOUS AS NEEDED
Status: DISCONTINUED | OUTPATIENT
Start: 2023-10-20 | End: 2023-10-20

## 2023-10-20 RX ORDER — LABETALOL HYDROCHLORIDE 5 MG/ML
5 INJECTION, SOLUTION INTRAVENOUS ONCE AS NEEDED
Status: DISCONTINUED | OUTPATIENT
Start: 2023-10-20 | End: 2023-10-20 | Stop reason: HOSPADM

## 2023-10-20 RX ORDER — BUPIVACAINE HYDROCHLORIDE 2.5 MG/ML
INJECTION, SOLUTION INFILTRATION; PERINEURAL AS NEEDED
Status: DISCONTINUED | OUTPATIENT
Start: 2023-10-20 | End: 2023-10-20 | Stop reason: HOSPADM

## 2023-10-20 RX ORDER — HYDROMORPHONE HYDROCHLORIDE 1 MG/ML
0.5 INJECTION, SOLUTION INTRAMUSCULAR; INTRAVENOUS; SUBCUTANEOUS EVERY 5 MIN PRN
Status: DISCONTINUED | OUTPATIENT
Start: 2023-10-20 | End: 2023-10-20 | Stop reason: HOSPADM

## 2023-10-20 RX ORDER — CEFAZOLIN SODIUM 2 G/100ML
2 INJECTION, SOLUTION INTRAVENOUS EVERY 8 HOURS
Status: DISCONTINUED | OUTPATIENT
Start: 2023-10-20 | End: 2023-10-20 | Stop reason: HOSPADM

## 2023-10-20 RX ORDER — MIDAZOLAM HYDROCHLORIDE 1 MG/ML
INJECTION, SOLUTION INTRAMUSCULAR; INTRAVENOUS AS NEEDED
Status: DISCONTINUED | OUTPATIENT
Start: 2023-10-20 | End: 2023-10-20

## 2023-10-20 RX ORDER — SODIUM CHLORIDE, SODIUM LACTATE, POTASSIUM CHLORIDE, CALCIUM CHLORIDE 600; 310; 30; 20 MG/100ML; MG/100ML; MG/100ML; MG/100ML
100 INJECTION, SOLUTION INTRAVENOUS CONTINUOUS
Status: DISCONTINUED | OUTPATIENT
Start: 2023-10-20 | End: 2023-10-20 | Stop reason: HOSPADM

## 2023-10-20 RX ORDER — OXYCODONE AND ACETAMINOPHEN 5; 325 MG/1; MG/1
1 TABLET ORAL EVERY 4 HOURS PRN
Status: DISCONTINUED | OUTPATIENT
Start: 2023-10-20 | End: 2023-10-20 | Stop reason: HOSPADM

## 2023-10-20 RX ORDER — LIDOCAINE HYDROCHLORIDE 10 MG/ML
0.1 INJECTION, SOLUTION EPIDURAL; INFILTRATION; INTRACAUDAL; PERINEURAL ONCE
Status: DISCONTINUED | OUTPATIENT
Start: 2023-10-20 | End: 2023-10-20 | Stop reason: HOSPADM

## 2023-10-20 RX ORDER — OXYCODONE HYDROCHLORIDE 5 MG/1
5 TABLET ORAL EVERY 4 HOURS PRN
Status: DISCONTINUED | OUTPATIENT
Start: 2023-10-20 | End: 2023-10-20 | Stop reason: HOSPADM

## 2023-10-20 RX ORDER — ALBUTEROL SULFATE 0.83 MG/ML
2.5 SOLUTION RESPIRATORY (INHALATION) ONCE AS NEEDED
Status: DISCONTINUED | OUTPATIENT
Start: 2023-10-20 | End: 2023-10-20 | Stop reason: HOSPADM

## 2023-10-20 RX ORDER — LIDOCAINE HYDROCHLORIDE 20 MG/ML
INJECTION, SOLUTION EPIDURAL; INFILTRATION; INTRACAUDAL; PERINEURAL AS NEEDED
Status: DISCONTINUED | OUTPATIENT
Start: 2023-10-20 | End: 2023-10-20

## 2023-10-20 RX ORDER — DIPHENHYDRAMINE HYDROCHLORIDE 50 MG/ML
12.5 INJECTION INTRAMUSCULAR; INTRAVENOUS ONCE AS NEEDED
Status: DISCONTINUED | OUTPATIENT
Start: 2023-10-20 | End: 2023-10-20 | Stop reason: HOSPADM

## 2023-10-20 RX ORDER — HYDROMORPHONE HYDROCHLORIDE 1 MG/ML
0.2 INJECTION, SOLUTION INTRAMUSCULAR; INTRAVENOUS; SUBCUTANEOUS EVERY 5 MIN PRN
Status: DISCONTINUED | OUTPATIENT
Start: 2023-10-20 | End: 2023-10-20 | Stop reason: HOSPADM

## 2023-10-20 RX ORDER — ONDANSETRON HYDROCHLORIDE 2 MG/ML
4 INJECTION, SOLUTION INTRAVENOUS ONCE AS NEEDED
Status: DISCONTINUED | OUTPATIENT
Start: 2023-10-20 | End: 2023-10-20 | Stop reason: HOSPADM

## 2023-10-20 RX ORDER — FENTANYL CITRATE 50 UG/ML
INJECTION, SOLUTION INTRAMUSCULAR; INTRAVENOUS AS NEEDED
Status: DISCONTINUED | OUTPATIENT
Start: 2023-10-20 | End: 2023-10-20

## 2023-10-20 RX ORDER — CEFAZOLIN SODIUM 2 G/100ML
2 INJECTION, SOLUTION INTRAVENOUS ONCE
Status: DISCONTINUED | OUTPATIENT
Start: 2023-10-20 | End: 2023-10-20 | Stop reason: HOSPADM

## 2023-10-20 RX ORDER — ONDANSETRON HYDROCHLORIDE 2 MG/ML
INJECTION, SOLUTION INTRAVENOUS AS NEEDED
Status: DISCONTINUED | OUTPATIENT
Start: 2023-10-20 | End: 2023-10-20

## 2023-10-20 RX ORDER — HYDRALAZINE HYDROCHLORIDE 20 MG/ML
5 INJECTION INTRAMUSCULAR; INTRAVENOUS EVERY 30 MIN PRN
Status: DISCONTINUED | OUTPATIENT
Start: 2023-10-20 | End: 2023-10-20 | Stop reason: HOSPADM

## 2023-10-20 RX ORDER — ACETAMINOPHEN 325 MG/1
975 TABLET ORAL ONCE
Status: DISCONTINUED | OUTPATIENT
Start: 2023-10-20 | End: 2023-10-20 | Stop reason: HOSPADM

## 2023-10-20 RX ADMIN — SODIUM CHLORIDE, SODIUM LACTATE, POTASSIUM CHLORIDE, AND CALCIUM CHLORIDE: 600; 310; 30; 20 INJECTION, SOLUTION INTRAVENOUS at 07:30

## 2023-10-20 RX ADMIN — CEFAZOLIN SODIUM 2 G: 2 INJECTION, SOLUTION INTRAVENOUS at 07:49

## 2023-10-20 RX ADMIN — FENTANYL CITRATE 50 MCG: 50 INJECTION, SOLUTION INTRAMUSCULAR; INTRAVENOUS at 07:49

## 2023-10-20 RX ADMIN — PROPOFOL 200 MG: 10 INJECTION, EMULSION INTRAVENOUS at 07:49

## 2023-10-20 RX ADMIN — OXYCODONE HYDROCHLORIDE 5 MG: 5 TABLET ORAL at 09:29

## 2023-10-20 RX ADMIN — LIDOCAINE HYDROCHLORIDE 80 MG: 20 INJECTION, SOLUTION EPIDURAL; INFILTRATION; INTRACAUDAL; PERINEURAL at 07:49

## 2023-10-20 RX ADMIN — DEXAMETHASONE SODIUM PHOSPHATE 4 MG: 4 INJECTION, SOLUTION INTRAMUSCULAR; INTRAVENOUS at 07:59

## 2023-10-20 RX ADMIN — FENTANYL CITRATE 50 MCG: 50 INJECTION, SOLUTION INTRAMUSCULAR; INTRAVENOUS at 07:58

## 2023-10-20 RX ADMIN — ONDANSETRON 4 MG: 2 INJECTION INTRAMUSCULAR; INTRAVENOUS at 07:59

## 2023-10-20 RX ADMIN — MIDAZOLAM 2 MG: 1 INJECTION INTRAMUSCULAR; INTRAVENOUS at 07:32

## 2023-10-20 SDOH — HEALTH STABILITY: MENTAL HEALTH: CURRENT SMOKER: 1

## 2023-10-20 ASSESSMENT — PAIN - FUNCTIONAL ASSESSMENT
PAIN_FUNCTIONAL_ASSESSMENT: 0-10

## 2023-10-20 ASSESSMENT — PAIN SCALES - GENERAL
PAINLEVEL_OUTOF10: 0 - NO PAIN
PAINLEVEL_OUTOF10: 2
PAINLEVEL_OUTOF10: 0 - NO PAIN
PAINLEVEL_OUTOF10: 0 - NO PAIN

## 2023-10-20 ASSESSMENT — COLUMBIA-SUICIDE SEVERITY RATING SCALE - C-SSRS
1. IN THE PAST MONTH, HAVE YOU WISHED YOU WERE DEAD OR WISHED YOU COULD GO TO SLEEP AND NOT WAKE UP?: NO
2. HAVE YOU ACTUALLY HAD ANY THOUGHTS OF KILLING YOURSELF?: NO
6. HAVE YOU EVER DONE ANYTHING, STARTED TO DO ANYTHING, OR PREPARED TO DO ANYTHING TO END YOUR LIFE?: NO

## 2023-10-20 ASSESSMENT — PAIN SCALES - PAIN ASSESSMENT IN ADVANCED DEMENTIA (PAINAD): BREATHING: NORMAL

## 2023-10-20 NOTE — ANESTHESIA PREPROCEDURE EVALUATION
Patient: Che Arcos    Procedure Information       Date/Time: 10/20/23 0730    Procedure: Right breast excisional biopsy (Right)    Location: [facility] OR 07 / [facility]    Surgeons: Ynes Benavides DO            Relevant Problems   Neuro/Psych   (+) Anxiety       Clinical information reviewed:   Tobacco  Allergies  Meds  Problems  Med Hx  Surg Hx  OB Status    Fam Hx  Soc Hx        NPO Detail:  NPO/Void Status  Date of Last Liquid: 10/19/23  Time of Last Liquid: 2200  Date of Last Solid: 10/19/23  Time of Last Solid: 2200  Last Intake Type: Clear fluids  Time of Last Void: 0600         Physical Exam    Airway  Mallampati: I  TM distance: >3 FB     Cardiovascular   Rhythm: regular     Dental - normal exam     Pulmonary   Breath sounds clear to auscultation     Abdominal            Anesthesia Plan    ASA 2     general     The patient is a current smoker.  Patient was previously instructed to abstain from smoking on day of procedure.  Patient did not smoke on day of procedure.    intravenous induction   Anesthetic plan and risks discussed with patient.    Plan discussed with CRNA.

## 2023-10-20 NOTE — OP NOTE
Date: 10/20/2023  OR Location: [facility]    Name: Che Arcos, : 1980, Age: 43 y.o., MRN: [id number], Sex: female    Diagnosis  Pre-op Diagnosis     * Sebaceous cyst [L72.3] Post-op Diagnosis     * Sebaceous cyst [L72.3]     Procedures  Right breast excisional biopsy  18368 - [place] EXC CYST/ABERRANT BREAST TISSUE OPEN 1/> LESION      Surgeons      * Ynes Benavides - Primary    Resident/Fellow/Other Assistant:  Surgeon(s) and Role:    Procedure Summary  Anesthesia: General  ASA: II  Anesthesia Staff: Anesthesiologist: Junaid Weber MD  CRNA: DILMA Gaines-CRNA  Estimated Blood Loss: 2mL    Staff:   Circulator: Dagmar Ford RN  Relief Scrub: Nani Augustine  Scrub Person: Yeimy Major; Ying Rapp    Details of Procedure:    The patient was identified by name and birthdate and transported to the operative suite and placed supine on the OR table.  A sign-in was performed verifying patient identity, procedure and laterality.  One dose of preoperative antibiotics was given.  After induction of anesthesia the right breast and axilla were prepped and draped in the usual sterile fashion.  Ultrasound was used to confirm the location of the lesion to be excised.  Just prior to incision, a time-out was performed confirming patient identity, procedure and laterality.  An elliptical incision was made encompassing the cyst and visible sinus tract. The target lesion was then circumferentially dissected using electrocautery.  Once the specimen was completely dissected, it was marked for orientation using a silk suture- short suture superior, long lateral and passed off the field. The dermis was closed with 3-0 Vicryl suture and the skin was closed with a running 4-0 Monocryl.  Benzoin and steri-strips were used as dressing.  The patient was then awoken from anesthesia and transported to the PACU in satisfactory condition.    SPECIMEN:    Right breast excisional biopsy- short suture superior, long  lateral      Ynes Benavides, DO

## 2023-10-20 NOTE — DISCHARGE INSTRUCTIONS
BREAST SURGERY DISCHARGE INSTRUCTIONS    Wound Care    Do not remove the surgical bra for 24 hours.  Wear the bra to bed to reduce movement.  Your incisions are covered with steri strips.  Leave these in place until they begin to lift from the edges.  If steri strips are still in place after 10 days you may remove the strips.  You can place ice on your breat for the first 24 hours.      Activity    You may shower after 24 hours, but no baths, tubs, or swimming for 2 weeks.  Do not live anything more than 10lbs or do any strenuous activity until seen in post op.  You may drive when you are not taking narcotic pain medication    Medication    Take prescription  narcotic medication for severe pain.  Do not drive while taking narcotics.  You may take acetaminophen (Tylenol), ibuprofen (Motrin), or naproxen (Aleve) for pain.    When to Call    Your wound is red, swollen, or has a lot of bleeding or drainage.  Your pain is not controlled by the medicines.  You have a fever or 100F or greater.    Follow Up    Dr. Benavides will review your pathology results at your post-op visit.  Please call the office at [phone] to make a follow up appointment.

## 2023-10-20 NOTE — ANESTHESIA POSTPROCEDURE EVALUATION
Patient: Che Arcos    Procedure Summary       Date: 10/20/23 Room / Location: [facility] OR  / [facility]    Anesthesia Start: 0732 Anesthesia Stop: 0834    Procedure: Right breast excisional biopsy (Right) Diagnosis:       Sebaceous cyst      (Sebaceous cyst [L72.3])    Surgeons: Ynes Benavides DO Responsible Provider: Junaid Weber MD    Anesthesia Type: general ASA Status: 2            Anesthesia Type: general    Vitals Value Taken Time   /58 10/20/23 0830   Temp 36 10/20/23 0840   Pulse 74 10/20/23 0838   Resp 31 10/20/23 0838   SpO2 93 % 10/20/23 0838   Vitals shown include unvalidated device data.    Anesthesia Post Evaluation    Patient location during evaluation: PACU  Patient participation: complete - patient participated  Level of consciousness: awake and alert  Pain management: adequate  Multimodal analgesia pain management approach  Airway patency: patent  Cardiovascular status: acceptable  Respiratory status: acceptable  Hydration status: acceptable  Comments: Report to [place] PACU RN        No notable events documented.

## 2023-10-20 NOTE — BRIEF OP NOTE
Date: 10/20/2023  OR Location: [facility] OR    Name: Che Arcos, : 1980, Age: 43 y.o., MRN: [id number], Sex: female    Diagnosis  Pre-op Diagnosis     * Sebaceous cyst [L72.3] Post-op Diagnosis     * Sebaceous cyst [L72.3]     Procedures  Right breast excisional biopsy  75258 - [place] EXC CYST/ABERRANT BREAST TISSUE OPEN 1/> LESION      Surgeons      * Ynes Benavides - Primary    Resident/Fellow/Other Assistant:  Surgeon(s) and Role:    Procedure Summary  Anesthesia: General  ASA: II  Anesthesia Staff: Anesthesiologist: Junaid Weber MD  CRNA: DILMA Gaines-CRNA  Estimated Blood Loss: 2 mL  Intra-op Medications:   Medication Name Total Dose   BUPivacaine HCl (Marcaine) 0.25 % (2.5 mg/mL) injection 20 mL   ceFAZolin in dextrose (iso-os) (Ancef) IVPB 2 g 2 g              Anesthesia Record               Intraprocedure I/O Totals          Intake    .00 mL    Total Intake 800 mL          Specimen:   ID Type Source Tests Collected by Time   1 : RIGHT BREAST EXCISIONAL BX: LONG STITCH = LATERAL AND SHORT STITCH = SUPERIOS Tissue SKIN EXCISIONAL BIOPSY SURGICAL PATHOLOGY EXAM Ynes Benavides,  10/20/2023 0809        Staff:   Circulator: Dagmar Ford RN  Relief Scrub: Nani Augustine  Scrub Person: Yeimy Major; Ying Rapp          Findings: Right breast sebaceous cyst excised without violation of capsule    Complications:  None; patient tolerated the procedure well.     Disposition: PACU - hemodynamically stable.  Condition: stable  Specimens Collected:   ID Type Source Tests Collected by Time   1 : RIGHT BREAST EXCISIONAL BX: LONG STITCH = LATERAL AND SHORT STITCH = SUPERIOS Tissue SKIN EXCISIONAL BIOPSY SURGICAL PATHOLOGY EXAM Ynes Benaivdes DO 10/20/2023 0809     Attending Attestation: I was present and scrubbed for the entire procedure.    Ynes Benavides  Phone Number: [phone]

## 2023-10-24 LAB
LABORATORY COMMENT REPORT: NORMAL
PATH REPORT.FINAL DX SPEC: NORMAL
PATH REPORT.GROSS SPEC: NORMAL
PATH REPORT.RELEVANT HX SPEC: NORMAL
PATH REPORT.TOTAL CANCER: NORMAL

## 2023-10-27 ENCOUNTER — LAB (OUTPATIENT)
Dept: LAB | Facility: LAB | Age: 43
End: 2023-10-27
Payer: COMMERCIAL

## 2023-10-27 DIAGNOSIS — Z00.00 HEALTHCARE MAINTENANCE: ICD-10-CM

## 2023-10-27 LAB
25(OH)D3 SERPL-MCNC: 21 NG/ML (ref 30–100)
ALBUMIN SERPL BCP-MCNC: 4 G/DL (ref 3.4–5)
ALP SERPL-CCNC: 56 U/L (ref 33–110)
ALT SERPL W P-5'-P-CCNC: 12 U/L (ref 7–45)
ANION GAP SERPL CALC-SCNC: 13 MMOL/L (ref 10–20)
AST SERPL W P-5'-P-CCNC: 13 U/L (ref 9–39)
BILIRUB SERPL-MCNC: 0.6 MG/DL (ref 0–1.2)
BUN SERPL-MCNC: 10 MG/DL (ref 6–23)
CALCIUM SERPL-MCNC: 8.7 MG/DL (ref 8.6–10.3)
CHLORIDE SERPL-SCNC: 104 MMOL/L (ref 98–107)
CHOLEST SERPL-MCNC: 152 MG/DL (ref 0–199)
CHOLESTEROL/HDL RATIO: 2.3
CO2 SERPL-SCNC: 24 MMOL/L (ref 21–32)
CREAT SERPL-MCNC: 0.65 MG/DL (ref 0.5–1.05)
ERYTHROCYTE [DISTWIDTH] IN BLOOD BY AUTOMATED COUNT: 12.7 % (ref 11.5–14.5)
GFR SERPL CREATININE-BSD FRML MDRD: >90 ML/MIN/1.73M*2
GLUCOSE SERPL-MCNC: 93 MG/DL (ref 74–99)
HCT VFR BLD AUTO: 40.9 % (ref 36–46)
HDLC SERPL-MCNC: 66.4 MG/DL
HGB BLD-MCNC: 13.4 G/DL (ref 12–16)
LDLC SERPL CALC-MCNC: 63 MG/DL
MCH RBC QN AUTO: 30.8 PG (ref 26–34)
MCHC RBC AUTO-ENTMCNC: 32.8 G/DL (ref 32–36)
MCV RBC AUTO: 94 FL (ref 80–100)
NON HDL CHOLESTEROL: 86 MG/DL (ref 0–149)
NRBC BLD-RTO: 0 /100 WBCS (ref 0–0)
PLATELET # BLD AUTO: 233 X10*3/UL (ref 150–450)
PMV BLD AUTO: 11.5 FL (ref 7.5–11.5)
POTASSIUM SERPL-SCNC: 4.3 MMOL/L (ref 3.5–5.3)
PROT SERPL-MCNC: 6.4 G/DL (ref 6.4–8.2)
RBC # BLD AUTO: 4.35 X10*6/UL (ref 4–5.2)
SODIUM SERPL-SCNC: 137 MMOL/L (ref 136–145)
TRIGL SERPL-MCNC: 112 MG/DL (ref 0–149)
TSH SERPL-ACNC: 1.29 MIU/L (ref 0.44–3.98)
VLDL: 22 MG/DL (ref 0–40)
WBC # BLD AUTO: 6.2 X10*3/UL (ref 4.4–11.3)

## 2023-10-27 PROCEDURE — 80061 LIPID PANEL: CPT

## 2023-10-27 PROCEDURE — 84443 ASSAY THYROID STIM HORMONE: CPT

## 2023-10-27 PROCEDURE — 80053 COMPREHEN METABOLIC PANEL: CPT

## 2023-10-27 PROCEDURE — 36415 COLL VENOUS BLD VENIPUNCTURE: CPT

## 2023-10-27 PROCEDURE — 82306 VITAMIN D 25 HYDROXY: CPT

## 2023-10-27 PROCEDURE — 85027 COMPLETE CBC AUTOMATED: CPT

## 2023-10-28 ENCOUNTER — TELEPHONE (OUTPATIENT)
Dept: PRIMARY CARE | Facility: CLINIC | Age: 43
End: 2023-10-28
Payer: COMMERCIAL

## 2023-10-28 DIAGNOSIS — E55.9 HYPOVITAMINOSIS D: Primary | ICD-10-CM

## 2023-10-28 RX ORDER — ERGOCALCIFEROL 1.25 MG/1
50000 CAPSULE ORAL
Qty: 12 CAPSULE | Refills: 0 | Status: SHIPPED | OUTPATIENT
Start: 2023-10-28 | End: 2024-01-20

## 2023-10-30 ENCOUNTER — OFFICE VISIT (OUTPATIENT)
Dept: ENDOCRINOLOGY | Facility: CLINIC | Age: 43
End: 2023-10-30
Payer: COMMERCIAL

## 2023-10-30 VITALS
TEMPERATURE: 97.2 F | HEART RATE: 104 BPM | DIASTOLIC BLOOD PRESSURE: 74 MMHG | RESPIRATION RATE: 18 BRPM | BODY MASS INDEX: 39.86 KG/M2 | HEIGHT: 59 IN | WEIGHT: 197.7 LBS | SYSTOLIC BLOOD PRESSURE: 115 MMHG

## 2023-10-30 DIAGNOSIS — E66.9 OBESITY (BMI 35.0-39.9 WITHOUT COMORBIDITY): ICD-10-CM

## 2023-10-30 DIAGNOSIS — Z76.89 ENCOUNTER FOR WEIGHT MANAGEMENT: ICD-10-CM

## 2023-10-30 LAB — POC HEMOGLOBIN A1C: 5.1 % (ref 4.2–6.5)

## 2023-10-30 PROCEDURE — 83036 HEMOGLOBIN GLYCOSYLATED A1C: CPT | Performed by: INTERNAL MEDICINE

## 2023-10-30 PROCEDURE — 99204 OFFICE O/P NEW MOD 45 MIN: CPT | Performed by: INTERNAL MEDICINE

## 2023-10-30 PROCEDURE — 4004F PT TOBACCO SCREEN RCVD TLK: CPT | Performed by: INTERNAL MEDICINE

## 2023-10-30 PROCEDURE — 3008F BODY MASS INDEX DOCD: CPT | Performed by: INTERNAL MEDICINE

## 2023-10-30 ASSESSMENT — PAIN SCALES - GENERAL: PAINLEVEL: 0-NO PAIN

## 2023-10-30 NOTE — PROGRESS NOTES
Patient is seen at the request of Dr. Cooper for my opinion regarding weight management. My final recommendations will be communicated back to the requesting provider by way of shared medical record.    NEW  Subjective   Che Arcos is a 43 y.o. female with a hx of low vit D, kidney stones, left thyroid nodule, weight gain who presents for weight management and obesity.    1. Weight history: never small  --Any previous programs: tried them all    2. Sleep: going for sleep appointment next month     3. 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 Exercise: trying to walk 5 days a week, for 2 miles     5. Appetite control: no control  --took phentermine -- did well with it - but at the time took it for 12 weeks.    Any personal history of pancreatitis?: no  Any personal or family history of medullary thyroid cancer or MEN (multiple endocrine neoplasia)?: no    Diet:  Breakfast: [employer] cosmic bread, an egg, cheese  Lunch: salad or soup, fastfood  Dinner: taco's, spaggehti and meatballs, salads  Bedtime snacks: chips, cheese, crackers, wine      Current Outpatient Medications:     ergocalciferol (Vitamin D-2) 1.25 MG (15015 UT) capsule, Take 1 capsule (50,000 Units) by mouth 1 (one) time per week., Disp: 12 capsule, Rfl: 0    ROS:  System: normal  Eyes : no visual changes  Neck : no tenderness, no new lumps/bumps  Respiratory : no SOB  Cardiovascular : no chest pain, no palpitations  Gastro-Intestinal : no abdominal concerns  Neurological : no numbness or tingling in the extremities  Musculoskeletal : no joint paint, no muscle pain  Skin : no unusual rashes  Psychiatric : no depression, no anxiety  See HPI for Endocrine ROS    Past Medical History:   Diagnosis Date    Anxiety     Sebaceous cyst of breast        Past Surgical History:   Procedure Laterality Date    BREAST SURGERY  2006    Reduction     SECTION, LOW TRANSVERSE  7/25/14    x2       Social History     Socioeconomic  "History    Marital status:      Spouse name: Not on file    Number of children: Not on file    Years of education: Not on file    Highest education level: Not on file   Occupational History    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 [employer]   Tobacco Use    Smoking status: Every Day     Packs/day: 0.25     Years: 15.00     Additional pack years: 0.00     Total pack years: 3.75     Types: Cigarettes    Smokeless tobacco: Never   Vaping Use    Vaping Use: Never used   Substance and Sexual Activity    Alcohol use: Yes     Alcohol/week: 8.0 standard drinks of alcohol     Types: 8 Glasses of wine per week    Drug use: Never    Sexual activity: Yes     Partners: Male     Birth control/protection: Male Sterilization   Other Topics Concern    Not on file   Social History Narrative    Not on file     Social Determinants of Health     Financial Resource Strain: Not on file   Food Insecurity: Not on file   Transportation Needs: Not on file   Physical Activity: Not on file   Stress: Not on file   Social Connections: Not on file   Intimate Partner Violence: Not on file   Housing Stability: Not on file       Objective    Physical Exam:  Blood pressure 115/74, pulse 104, temperature 36.2 °C (97.2 °F), resp. rate 18, height 1.499 m (4' 11\"), weight 89.7 kg (197 lb 11.2 oz), last menstrual period 10/10/2023.  General : alert and oriented X3, no acute distress  Eyes : EOMI   Neck : non tender, supple  Thyroid : no palpable nodules  Heart : regular rate and rhythm  ABD : no obvious abnormalities, soft to palpation  Extremities : no edema  Neuro : normal  Other :    Assessment/Plan   Che Arcos is a 43 y.o. female with a hx of low vit D, kidney stones, left thyroid nodule, weight gain who presents for weight management and obesity.    1. Weight Management : Reviewed the principles of energy metabolism, caloric intake and expenditure, and rationale for a treatment program.  Also reinforced need for reduced calorie, low fat diet and " increased physical activity.    We reviewed the possibility of starting an interdisciplinary lifestyle intervention program involving improvement of the diet, a personalized exercise program, efforts to reduce the stress and the possibility of using appetite suppressant medications in an effort to help with the weight loss process.  The patient expressed interest in the plan.    2. Nutrition : I discussed trying one of the diet approaches we have here in the program : Mediterranean lifestyle, ketosis diet.  The patient will be referred to the Registered Dietician for education on their diet of choice.  The dietary booklet was provided in the visit today.  10/30/2023: 197.7lb, 89.7kg/m2    3. Sleep : Going for an appointment for this soon.    4. Stress : managed, , has three kids, is a .    5. Exercise : trying to walk 5 days a week, for 2 miles   Encouraged strength training - showed her [employer] videos.    6. Appetite :  took phentermine -- did well with it - but at the time took it for 12 weeks.  Discussed AOM's.  Has h/o kidney stones - so topamax is contraindicated.  Hga1c (10/30/23): 5.1% - normal    Follow up in a group visit.  Radames Lan MD

## 2023-11-06 ENCOUNTER — OFFICE VISIT (OUTPATIENT)
Dept: SURGICAL ONCOLOGY | Facility: HOSPITAL | Age: 43
End: 2023-11-06
Payer: COMMERCIAL

## 2023-11-06 VITALS — HEIGHT: 59 IN | BODY MASS INDEX: 37.29 KG/M2 | WEIGHT: 185 LBS

## 2023-11-06 DIAGNOSIS — N60.01 BREAST CYST, RIGHT: Primary | ICD-10-CM

## 2023-11-06 PROCEDURE — 4004F PT TOBACCO SCREEN RCVD TLK: CPT | Performed by: SURGERY

## 2023-11-06 PROCEDURE — 3008F BODY MASS INDEX DOCD: CPT | Performed by: SURGERY

## 2023-11-06 PROCEDURE — 99024 POSTOP FOLLOW-UP VISIT: CPT | Performed by: SURGERY

## 2023-11-07 PROBLEM — L72.3 SEBACEOUS CYST: Status: RESOLVED | Noted: 2023-09-25 | Resolved: 2023-11-07

## 2023-11-07 NOTE — PROGRESS NOTES
BREAST SURGERY POST OPERATIVE VISIT    Assessment/Plan     Right breast inclusion cyst s/p surgical excision.    Pathology is benign.  Teresa can return to annual screening mammogram and see me on an as needed basis.     Subjective   Che Arcos is a 43 y.o. premenopausal female here for post op visit s/p excsion right breast inclusion cyst.    Objective   Physical Exam  General: Alert and oriented x 3.  Mood and affect are appropriate.  HEENT: EOMI, PERRLA.   Neck: supple, no masses, no cervical adenopathy.  Cardiovascular: no lower extremity edema.  Pulmonary: breathing non labored on room air.  GI: Abdomen soft, no masses. No hepatomegaly or splenomegaly.  Lymph nodes: No supraclavicular or axillary adenopathy bilaterally.  Musculoskeletal: Full range of motion in the upper extremities bilaterally.  Neuro: denies dizziness, tremors    Breasts: The breasts were examined in both the seated and supine positions.    RIGHT: The nipple is everted without nipple discharge.  There are no skin changes, skin thickening, or dimpling. There are no masses palpated in the RIGHT breast. Healing medial IMF incision.  LEFT: The nipple is everted without nipple discharge.  There are no skin changes, skin thickening, or dimpling. There are no masses palpated in the LEFT breast.     Radiology review: All images and reports were personally reviewed.         Ynes Benavides DO

## 2023-11-13 DIAGNOSIS — E66.9 OBESITY (BMI 35.0-39.9 WITHOUT COMORBIDITY): ICD-10-CM

## 2023-11-13 RX ORDER — PHENTERMINE HYDROCHLORIDE 37.5 MG/1
TABLET ORAL
Qty: 30 TABLET | Refills: 2 | Status: SHIPPED | OUTPATIENT
Start: 2023-11-13 | End: 2023-11-13 | Stop reason: SDUPTHER

## 2023-11-13 RX ORDER — PHENTERMINE HYDROCHLORIDE 37.5 MG/1
TABLET ORAL
Qty: 30 TABLET | Refills: 2 | Status: SHIPPED | OUTPATIENT
Start: 2023-11-13

## 2023-11-13 NOTE — TELEPHONE ENCOUNTER
LOV: 10/30/23  NOV: 12/26/23  Patient sent [employer] message requesting to try Phentermine. Insurance will not cover Glp-1.  Phentermine pended  Patient notified to monitor BP and HR  Patient notified that consent form will be at  to sign

## 2023-11-21 ASSESSMENT — SLEEP AND FATIGUE QUESTIONNAIRES
ESS TOTAL SCORE: 1
HOW LIKELY ARE YOU TO NOD OFF OR FALL ASLEEP WHEN YOU ARE A PASSENGER IN A CAR FOR AN HOUR WITHOUT A BREAK: NO CHANCE OF DOZING
HOW LIKELY ARE YOU TO NOD OFF OR FALL ASLEEP WHILE SITTING QUIETLY AFTER LUNCH WITHOUT ALCOHOL: NO CHANCE OF DOZING
HOW LIKELY ARE YOU TO NOD OFF OR FALL ASLEEP WHILE LYING DOWN TO REST IN THE AFTERNOON WHEN CIRCUMSTANCES PERMIT: SLIGHT CHANCE OF DOZING
HOW LIKELY ARE YOU TO NOD OFF OR FALL ASLEEP WHILE WATCHING TV: NO CHANCE OF DOZING
HOW LIKELY ARE YOU TO NOD OFF OR FALL ASLEEP IN A CAR, WHILE STOPPED FOR A FEW MINUTES IN TRAFFIC: NO CHANCE OF DOZING
SITING INACTIVE IN A PUBLIC PLACE LIKE A CLASS ROOM OR A MOVIE THEATER: NO CHANCE OF DOZING
HOW LIKELY ARE YOU TO NOD OFF OR FALL ASLEEP WHILE SITTING AND TALKING TO SOMEONE: NO CHANCE OF DOZING
HOW LIKELY ARE YOU TO NOD OFF OR FALL ASLEEP WHILE SITTING AND READING: NO CHANCE OF DOZING

## 2023-11-21 NOTE — PROGRESS NOTES
Patient: Che Arcos    [id number]  : 1980 -- AGE 43 y.o.    Provider: Katie Williamson MD PhD     Location [facility]   Service Date: 2023              [facility]  New Visit Note      HISTORY OF PRESENT ILLNESS     The patient's referring provider is: Marleny Cooper DO     REASON FOR VISIT:   Chief Complaint   Patient presents with    New Patient Visit        HISTORY OF PRESENT ILLNESS   Ms. Che Arcos is a 43 y.o. female with history of anxiety who presents to a [facility] for a sleep medicine evaluation with concerns of snoring.    CURRENT HISTORY  On today's visit, the patient reports she needs a sleep study. 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    She reports snoring and gasping for air. She has dreams that she is drowning. She has a brother with ALFRED. She has gained about 30# since pandemic.    Sleep schedule:  She goes to bed at 10-11pm. She wakes at 7:30AM. She falls asleep reasonably quickly. She is awake about 2-3x per night. She wakes at 5Am for BR but then goes back to sleep.     Preferred sleeping position: sidelying  Typically sleeps with her .       Associated sleep symptoms:  Breathing during sleep: snoring, snorting during sleep, witnessed apneas, and gasping/choking for air  Behaviors at night: No   Sleep paralysis: No   Hypnogogic or hypnopompic hallucinations: No   Cataplexy: No     Leg symptoms and timing:  She describes some itchiness in the legs. Rubbing makes it go away. Does not affect sleep.      Sleep environment:  Pets in bed :   Yes  - dog - not in bed  Bedroom temperature: WNL  Noise :   No   Issues with bed comfort :   No       Daytime Symptoms:  On awakening patient reports: sleepiness in the AM    Daytime: She is tired but does not fall asleep during day. If had a break could sleep.    Driving History:   No issues of sleepiness.    ESS: 2  JAMIE: 10  FOSQ:  34      REVIEW OF SYSTEMS     REVIEW OF SYSTEMS  Review of Systems   All other systems reviewed and are negative.      ALLERGIES AND MEDICATIONS     ALLERGIES  No Known Allergies    MEDICATIONS  Current Outpatient Medications   Medication Sig Dispense Refill    ergocalciferol (Vitamin D-2) 1.25 MG (31483 UT) capsule Take 1 capsule (50,000 Units) by mouth 1 (one) time per week. 12 capsule 0    phentermine (Adipex-P) 37.5 mg tablet Take one tablet by mouth once a day BMI 37.37kg/m2 30 tablet 2     No current facility-administered medications for this visit.         PAST HISTORY     PAST MEDICAL HISTORY  She  has a past medical history of Anxiety, Sebaceous cyst (2023), and Sebaceous cyst of breast.      PAST SURGICAL HISTORY:  Past Surgical History:   Procedure Laterality Date    BREAST SURGERY  2006    Reduction     SECTION, LOW TRANSVERSE  7/25/14    x2       FAMILY HISTORY  Family History   Problem Relation Name Age of Onset    Depression Mother Erica Aj     Hearing loss Mother Erica Aj     Hyperlipidemia Mother Erica Aj     No Known Problems Father      No Known Problems Brother      No Known Problems Daughter      No Known Problems Son      No Known Problems Son       She {does have a family history of sleep disorder (e.g., sleep apnea, narcolepsy in any first degree relatives). Brother, grandmother, probably father has sleep apnea.      SOCIAL HISTORY  She  reports that she has been smoking cigarettes. She has a 3.75 pack-year smoking history. She has never used smokeless tobacco. She reports current alcohol use of about 8.0 standard drinks of alcohol per week. She reports that she does not use drugs. She currently lives with her .     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    Caffeine consumption: Yes - 2 per day  Alcohol consumption: Yes -  "3/week  Smoking: Yes - < 1/2 ppd  Marijuana: No  - past use      PHYSICAL EXAM     Physical Examination: /85 (BP Location: Right arm, Patient Position: Sitting, BP Cuff Size: Large adult)   Pulse 65   Temp 36.8 °C (98.3 °F) (Temporal)   Resp 18   Ht 1.499 m (4' 11\")   Wt 90.9 kg (200 lb 6.4 oz)   BMI 40.48 kg/m²     PREVIOUS WEIGHTS:  Wt Readings from Last 3 Encounters:   11/22/23 90.9 kg (200 lb 6.4 oz)   11/06/23 83.9 kg (185 lb)   10/30/23 89.7 kg (197 lb 11.2 oz)       General: The patient is a pleasant female, in no acute distress. HEENT: She has a  a modified Mallampati grade 3 airway with Numbers; 0-4 (with +): 1+ tonsils bilaterally. The soft palate was normal and the uvula was normal. The A/P diameter of the velopharynx was narrowed. The oropharynx was not shallow in the A/P diameter. Lateral wall narrowing was not present. Tongue ridging was notpresent. Erythema of the posterior pharynx was not present. Mucosal hypertrophy in the posterior oropharynx was not present. Retrognathia was not and micrognathia was not present. Neck: The neck was not enlarged. No JVP, bruits or lymphadenopathy was appreciated. Chest: Clear to auscultation. No wheezes, rales, or rhonchi. Cardiovascular: Regular rate and rhythm. No murmurs, gallops, or clicks. Abdomen: Soft, nontender, nondistended. Positive bowel sounds. Extremities: No clubbing, cyanosis, or edema is noted. Neurologic exam: Alert, oriented x3 and was grossly non-focal.    RESULTS/DATA     No results found for: \"IRON\", \"TRANSFERRIN\", \"IRONSAT\", \"TIBC\", \"FERRITIN\"    Bicarbonate (mmol/L)   Date Value   10/27/2023 24   10/12/2023 25   05/27/2022 23   08/01/2019 26       DIAGNOSES     Problem List and Orders  Diagnoses and all orders for this visit:  Suspected sleep apnea  -     Home sleep apnea test (HSAT); Future  Class 3 severe obesity due to excess calories without serious comorbidity with body mass index (BMI) of 40.0 to 44.9 in adult " (CMS/[facility])        ASSESSMENT/PLAN     Ms. Arcos is a 43 y.o. female and  has a past medical history of Anxiety, Sebaceous cyst (09/25/2023), and Sebaceous cyst of breast. She presents to  the [facility] to address her snoring and witnessed apneas.      Snoring, possible ALFRED. Ms. Arcos is evaluated for loud snoring in the context of an elevated body mass index.  This raises concerns for obstructive sleep apnea (ALFRED). Given this, she should be scheduled for an home sleep study. The causes and potential consequences of ALFRED were discussed in detail with the patient.      We discussed what a home sleep apnea test (HSAT) involves. If HSAT is significant for moderate- severe ALFRED then we will pursue treatment. If testing is normal or equivocal, given the high pre-test probability, we may pursue in-lab sleep testing for further risk stratification. Additional sleep testing to demonstrate if CPAP or other treatments are beneficial may be necessary down the road.  If CPAP or APAP  is started for a diagnosis of ALFRED, she  was informed that she would need to return to the clinic for a follow-up in 1-3 months to assess initial response to PAP, address any PAP-related issue, and document PAP adherence. Other treatments including weight loss, positional therapy, surgical therapy, and oral appliances were also discussed.     Obesity/Overweight.  The patient was counseled that her weight is the strongest modifiable risk factor and contributor for ALFRED. She was counseled to consider weight loss options to include changes in dietary habits and activity. We briefly discussed the use of calorie tracking smartphone apps and the use of activity meters to provide feedback that helps in losing weight.  Before initiating an exercise plan, she should carefully review the approach with her primary care provider.  She is being seen by Dr. Frank and is on phetermine currently.      Follow up: after sleep testing          Katie Williamson MD PhD

## 2023-11-22 ENCOUNTER — OFFICE VISIT (OUTPATIENT)
Dept: SLEEP MEDICINE | Facility: CLINIC | Age: 43
End: 2023-11-22
Payer: COMMERCIAL

## 2023-11-22 VITALS
WEIGHT: 200.4 LBS | SYSTOLIC BLOOD PRESSURE: 125 MMHG | BODY MASS INDEX: 40.4 KG/M2 | TEMPERATURE: 98.3 F | RESPIRATION RATE: 18 BRPM | HEIGHT: 59 IN | DIASTOLIC BLOOD PRESSURE: 85 MMHG | HEART RATE: 65 BPM

## 2023-11-22 DIAGNOSIS — E66.01 CLASS 3 SEVERE OBESITY DUE TO EXCESS CALORIES WITHOUT SERIOUS COMORBIDITY WITH BODY MASS INDEX (BMI) OF 40.0 TO 44.9 IN ADULT (MULTI): ICD-10-CM

## 2023-11-22 DIAGNOSIS — R29.818 SUSPECTED SLEEP APNEA: Primary | ICD-10-CM

## 2023-11-22 PROCEDURE — 99204 OFFICE O/P NEW MOD 45 MIN: CPT | Performed by: INTERNAL MEDICINE

## 2023-11-22 PROCEDURE — 4004F PT TOBACCO SCREEN RCVD TLK: CPT | Performed by: INTERNAL MEDICINE

## 2023-11-22 PROCEDURE — 3008F BODY MASS INDEX DOCD: CPT | Performed by: INTERNAL MEDICINE

## 2023-11-22 NOTE — PATIENT INSTRUCTIONS
[facility]  [address]  [facility]  [address]  [facility] [address]    [facility]  [address]  [facility] [address]  [facility]  [address]  [address]  [facility] [address]           NAME: Che Arcos   DATE: 11/22/2023     Your Sleep Provider Today: Katie Williamson MD PhD  Your Primary Care Physician: Marleny Cooper, DO   Your Referring Provider: No ref. provider found    Thank you for coming to the Sleep Medicine Clinic today! Your sleep medicine provider today was: Katie Williamson MD PhD Below is a summary of your treatment plan, other important information, and our contact numbers:  If you need to schedule an appointment, please call [phone])  If you need general assistance (e.g. forms completed, general questions), please call my , Lana, at [phone].  If you have a medical question about your sleep issues, please contact our nurses, Maureen or Georgia at [phone].   You can also contact us through [employer].      DIAGNOSIS:   1. Suspected sleep apnea  Home sleep apnea test (HSAT)              TREATMENT PLAN     Scheduling a Sleep Study    Call the  Sleep Testing Center to speak with a sleep testing  to book your home sleep study procedure at one of our adult and pediatric-friendly sleep labs. Overnight sleep studies may be scheduled on a weekday or weekend.    We have child life services on a case by case basis at the [facility] location. We also perform daytime testing for shift workers on a case by case basis.    Locations for sleep studies are: [place], [facility] ([facility]), [place], [place], [facility], [place], [facility], [place], [place].    Bring your usual medications and nightly routine items for your sleep study. In order to fall asleep faster in sleep lab, we advise patients to wake up earlier on  the morning of the scheduled testing and avoid napping prior to testing. Sometimes, your provider may prescribe a sleep aid to be taken at lights out in the sleep lab. If you are taking a sleep aid, please have somebody pick you up after the sleep testing.    Results of your sleep study will be given to the ordering clinician. Please contact their office for results or follow up as directed by your clinician.    For additional information about the sleep medicine services, please call [phone]      Follow-up Appointment:   Followup with after testing by phone and in-person after we start treatment if it is needed.      IMPORTANT INFORMATION     Call 911 for medical emergencies.  Our offices are generally open from Monday-Friday, 9 am - 5 pm.  If you need to get in touch with me, you may either call me and my team(number is below) or you can use [employer].  If a referral for a test, for CPAP, or for another specialist was made, and you have not heard about scheduling this within a week, please call scheduling at [phone]).  If you are unable to make your appointment for clinic or an overnight study, kindly call the office at least 48 hours in advance to cancel and reschedule.  If you are on CPAP, please bring your device's card or the device to each clinic appointment.   There are no supporting services by either the sleep doctors or their staff on weekends and Holidays, or after 5 PM on weekdays.   If you have been asked to come to a sleep study, make sure you bring toiletries, a comfy pillow, and any nighttime medications that you may regularly take. Also be sure to eat dinner before you arrive. We generally do not provide meals.      PRESCRIPTIONS     We require 7 days advanced notice for prescription refills. If we do not receive the request in this time, we cannot guarantee that your medication will be refilled in time.      IMPORTANT PHONE NUMBERS     Sleep Medicine Clinic Fax:  [phone]  Appointments (for Pediatric Sleep Clinic): [phone] - option 1  Appointments (for Adult Sleep Clinic): [phone] - option 2  Appointments (For Sleep Studies): [phone] - option 3  Behavioral Sleep Medicine: [phone]  Sleep Surgery: [phone]  ENT (Otolaryngology): [phone]  Headache Clinic (Neurology): [phone]  Neurology: [phone]  Psychiatry: [phone]  Pulmonary Function Testing (PFT) Center: [phone]  Pulmonary Medicine: [phone]  [employer] (DME): [phone]  [employer] (DME): [phone]  [facility] ([facility]): [phone]      OUR ADULT SLEEP MEDICINE TEAM   Please do not hesitate to call the office or sleep nurse with any questions between appointments:    Adult Sleep Nurses (Georgia Norman, RN and Maureen Schneider RN):  For clinical questions and refilling prescriptions: [phone]  Email sleep diaries and other documents at: [email or web address]    Adult Sleep Medicine Secretaries:  Adrinene Ahmadi (For Nestor/Bass/Krise/Strohl/Yeh/Hernandez):   P: [phone]  F: [phone]  Lana Mcgowan (For Williamson/Guggenbiller): P: [phone]  Fax: [phone]  Tereza Lee (For Jurcevic/Blank): P: [phone]  F: [phone]  Bambi Waggoner (For Thalia): P: [phone]  F: [phone]  Yoselin Louise (For Samanta/Kaleb/Janell): P: [phone]  F: [phone]  Debora Cash (For Jluis/Mora): P: [phone]  F: [phone]     Adult Sleep Medicine Advanced Practice Providers:  Raffi Reyes ([place], [place])  Connie Manuel ([facility])  Carolynn Mittal CNP ([place], [place], [place])  Laura Pennington CNP ([place], [place], [place])  Ritu Vásquez ([place], [place], [place])  Carlos Mora CNP ([facility])        OUR SLEEP TESTING LOCATIONS     Our team will contact you to schedule your sleep study, however, you can contact us as follow:  Main Phone Line  "(scheduling only): [phone]), option 3  Adult and Pediatric Locations   [place] (6 years and older): [facility] - 4th floor ([address] [facility]) After hours line: [phone]  [facility] (Main campus: All ages): [facility], 6th floor. After hours line: [phone]   [place] (5 years and older; younger considered on case-by-case basis): [address]; [address], [address]. Scheduling  After hours line: [phone]   [place] (6 years and older): [address]; [address]; [address]   [place] (6 years and older): [address] [facility] [address]  After hours line: [phone]   [place] (13 years and older) in [place]: [address], 2nd floor  After hours line: [phone]   [place] (13 year and older): [address]  After hours line: [phone]     Adult Only Locations:   [place] (18 years and older): [address] [facility], 2nd floor   [place] (18 years and older): [address] [facility]; 4th floor  After hours line: [phone]   [place] (18 years and older) at [place]: [address] [place] [address]  After hours line: [phone]          CONTACTING YOUR SLEEP MEDICINE PROVIDER     Send a message directly to your provider through \"My Chart\", which is the email service through your  Records Account: [email or web address]   Call [phone] and leave a message. One of the administrative assistants will forward the message to your sleep medicine provider through \"My Chart\" and/or email.     Your sleep medicine provider for this visit was: Katie Williamson MD PhD        "

## 2024-01-02 ENCOUNTER — APPOINTMENT (OUTPATIENT)
Dept: PRIMARY CARE | Facility: CLINIC | Age: 44
End: 2024-01-02
Payer: COMMERCIAL

## 2024-01-09 ENCOUNTER — PROCEDURE VISIT (OUTPATIENT)
Dept: SLEEP MEDICINE | Facility: CLINIC | Age: 44
End: 2024-01-09
Payer: COMMERCIAL

## 2024-01-09 DIAGNOSIS — G47.9 SLEEP DISORDER, UNSPECIFIED: ICD-10-CM

## 2024-01-09 DIAGNOSIS — R29.818 SUSPECTED SLEEP APNEA: ICD-10-CM

## 2024-01-09 PROCEDURE — 95806 SLEEP STUDY UNATT&RESP EFFT: CPT | Performed by: INTERNAL MEDICINE

## 2024-01-10 NOTE — PROGRESS NOTES
Type of Study: HOME SLEEP STUDY - NOMAD     The patient received equipment and instructions for use of the [facility] Nomad HSAT device. The patient was instructed how to apply the effort belts, cannula, thermistor. It was also explained how the Nomad and oximeter components work.  The patient was asked to record their sleep for an 8-hour period.     The patient was informed of their responsibility for the device and acknowledged this by signing the HSAT device contract. The patient was asked to return the device on 1/10/2024 between the hours of 8:00 AM and 2:30 PM to the Sleep Center.     The patient was instructed to call 911 as usual for any medical- emergencies while at home.  The patient was also given a phone number for troubleshooting when using the device in case there were additional questions.

## 2024-01-11 ENCOUNTER — APPOINTMENT (OUTPATIENT)
Dept: SLEEP MEDICINE | Facility: CLINIC | Age: 44
End: 2024-01-11
Payer: COMMERCIAL

## 2024-01-16 ASSESSMENT — SLEEP AND FATIGUE QUESTIONNAIRES
HOW LIKELY ARE YOU TO NOD OFF OR FALL ASLEEP WHILE SITTING QUIETLY AFTER LUNCH WITHOUT ALCOHOL: WOULD NEVER DOZE
HOW LIKELY ARE YOU TO NOD OFF OR FALL ASLEEP WHILE WATCHING TV: WOULD NEVER DOZE
SITING INACTIVE IN A PUBLIC PLACE LIKE A CLASS ROOM OR A MOVIE THEATER: WOULD NEVER DOZE
HOW LIKELY ARE YOU TO NOD OFF OR FALL ASLEEP IN A CAR, WHILE STOPPED FOR A FEW MINUTES IN TRAFFIC: WOULD NEVER DOZE
HOW LIKELY ARE YOU TO NOD OFF OR FALL ASLEEP WHILE SITTING AND READING: SLIGHT CHANCE OF DOZING
HOW LIKELY ARE YOU TO NOD OFF OR FALL ASLEEP WHILE LYING DOWN TO REST IN THE AFTERNOON WHEN CIRCUMSTANCES PERMIT: MODERATE CHANCE OF DOZING
ESS-CHAD TOTAL SCORE: 3
HOW LIKELY ARE YOU TO NOD OFF OR FALL ASLEEP WHILE SITTING AND TALKING TO SOMEONE: WOULD NEVER DOZE
HOW LIKELY ARE YOU TO NOD OFF OR FALL ASLEEP WHEN YOU ARE A PASSENGER IN A CAR FOR AN HOUR WITHOUT A BREAK: WOULD NEVER DOZE

## 2024-01-18 ENCOUNTER — TELEMEDICINE CLINICAL SUPPORT (OUTPATIENT)
Dept: NUTRITION | Facility: CLINIC | Age: 44
End: 2024-01-18
Payer: COMMERCIAL

## 2024-01-18 DIAGNOSIS — Z71.3 DIETARY COUNSELING AND SURVEILLANCE: Primary | ICD-10-CM

## 2024-01-18 DIAGNOSIS — E66.9 OBESITY (BMI 35.0-39.9 WITHOUT COMORBIDITY): ICD-10-CM

## 2024-01-18 PROCEDURE — 97802 MEDICAL NUTRITION INDIV IN: CPT | Mod: 95

## 2024-01-18 PROCEDURE — 97802 MEDICAL NUTRITION INDIV IN: CPT

## 2024-01-18 NOTE — PROGRESS NOTES
"Nutrition: Initial Assessment    Reason for Nutrition Visit: Patient is a 43 y.o. female referred for obesity. Referred on 10/30/23 by Dr. Bhandari.    I performed this visit using real-time telehealth tools, including an audio/video connection between Che Arcos and this provider.     Nutrition Assessment    Food and Nutrient History: Pt presents for nutrition counseling. Past dieting attempts include low carb, [place], WW. 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 Has 3 children. She focuses on lean proteins, plenty of vegetables. Has been taking Phentermine and lost 11 lbs. Recently started exercising again.     Usually eating two meals per day   Snacks throughout day - feels this is a big issue  Dietary Supplements: vitamin D 50,000 UT once weekly   Food Insecurity: Denies    Physical Activity: Walking on treadmill 3 days per week     Labs:  Lab Results   Component Value Date    HGBA1C 5.1 10/30/2023    CHOL 152 10/27/2023    LDLCALC 63 10/27/2023    TRIG 112 10/27/2023    HDL 66.4 10/27/2023      Low vitamin D = 21 (10/28/23), noting pt rx vitamin D    Nutrition Focused Physical Exam:  Performed/Deferred: Deferred due to be being virtual visit    Past Medical History:  Patient Active Problem List   Diagnosis    Cervical cancer screening    Breast cancer screening by mammogram    Snoring    Breast cyst, right    Obesity (BMI 35.0-39.9 without comorbidity)    H/O  section    Hemorrhoid    Hypovitaminosis D    Left thyroid nodule    Weight gain    Pap smear for cervical cancer screening    Anxiety        Anthropometrics:  Ht Readings from Last 1 Encounters:   24 1.499 m (4' 11\")     BMI Readings from Last 1 Encounters:   24 40.48 kg/m²     Wt Readings from Last 10 Encounters:   23 90.9 kg (200 lb 6.4 oz)   23 83.9 kg (185 lb)   10/30/23 89.7 kg (197 lb 11.2 oz)   10/20/23 88.5 kg (195 lb)   10/12/23 90.7 kg (199 lb 15.3 oz)   10/02/23 " 88.5 kg (195 lb)   08/11/23 88.5 kg (195 lb)   07/17/23 89.4 kg (197 lb)   05/03/22 88.5 kg (195 lb)   11/13/20 81.6 kg (180 lb)     Estimated Energy Needs:    Total Energy Estimated Needs (kCal): 1255.6 kCal   Method for Estimating Needs: MSJ 1463 x 1.2 - 500 (for wt loss)   Total Protein Estimated Needs (g): 72 g   Total Protein Estimated Needs (g/kg): 0.8 g/kg    Nutrition Diagnosis     Patient has Nutrition Diagnosis: Yes Diagnosis Status (1): Ongoing  Nutrition Diagnosis 1: Food and nutrition related knowledge deficit Related to (1): exposure to a variety of nutrition-related education but she is not sure which rules are worthy of following As Evidenced by (1): patient has questions about changing her diet to promote weight loss       Nutrition Interventions/Recommendations   Meals & Snacks:   Mediterranean Meal Plan   Consistent meal/snack pattern    Nutrition Education:   Discussed starting an interdisciplinary weight management program that occurs in a small group setting and includes dietary interventions, physical activity discussions, and goal setting. Provided example meal plan, grocery list, and recipes for a Mediterranean-style diet.   Discussed ideas for more varied breakfast, such as using avocados instead of butter on toast.   Talked about using protein shakes as a meal replacement if needed. Discussed that this can be a stepping stone during really busy periods of life.     *Patient expressed understanding of the nutrition education and denied any additional questions/concerns.     Educational Handouts: Mediterranean Meal Plan Booklet     Nutrition Monitoring and Evaluation   Intentional weight loss of 0.5-2 lb per week, trending toward a clinically significant weight loss of 5-10% of current body weight  Consistent meal/snack pattern  Increased awareness and response to hunger and satiety cues      Readiness to Change : Excellent  Level of Understanding : Excellent  Anticipated Compliant :  Excellent     Follow-up: Patient is welcome to follow-up at any time. To schedule, please call [phone].

## 2024-01-22 VITALS — BODY MASS INDEX: 40.48 KG/M2 | HEIGHT: 59 IN

## 2024-01-30 ENCOUNTER — APPOINTMENT (OUTPATIENT)
Dept: ENDOCRINOLOGY | Facility: CLINIC | Age: 44
End: 2024-01-30
Payer: COMMERCIAL

## 2024-02-26 ENCOUNTER — TELEPHONE (OUTPATIENT)
Dept: SLEEP MEDICINE | Facility: HOSPITAL | Age: 44
End: 2024-02-26
Payer: COMMERCIAL

## 2024-02-26 NOTE — TELEPHONE ENCOUNTER
Patient called RE the availability of sleep study results. Sleep nurse number  given to call back for results and plan going forward.

## 2024-04-08 NOTE — PROGRESS NOTES
Subjective   Che Arcos is a 44y.o. female with a hx of low vit D, kidney stones, left thyroid nodule, weight gain who presents for weight management and obesity.    Virtual:  1. Nutrition: snacking less. Increasing protein. Making healthier choices.     2. Sleep: stable      3. 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 Exercise: not consistent     5. Appetite control: controlled  AOM currently taking: no- was on phentermine but not currently     6. Goal: start weight training videos       Current Outpatient Medications:     phentermine (Adipex-P) 37.5 mg tablet, Take one tablet by mouth once a day BMI 37.37kg/m2, Disp: 30 tablet, Rfl: 2    ROS:  System: normal  Eyes : no visual changes  Neck : no tenderness, no new lumps/bumps  Respiratory : no SOB  Cardiovascular : no chest pain, no palpitations  Gastro-Intestinal : no abdominal concerns  Neurological : no numbness or tingling in the extremities  Musculoskeletal : no joint paint, no muscle pain  Skin : no unusual rashes  Psychiatric : no depression, no anxiety  See HPI for Endocrine ROS    Past Medical History:   Diagnosis Date    Anxiety     Sebaceous cyst 2023    Sebaceous cyst of breast        Past Surgical History:   Procedure Laterality Date    BREAST SURGERY  2006    Reduction     SECTION, LOW TRANSVERSE  7/25/14    x2       Social History     Socioeconomic History    Marital status:      Spouse name: Not on file    Number of children: Not on file    Years of education: Not on file    Highest education level: Not on file   Occupational History    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 [employer]   Tobacco Use    Smoking status: Every Day     Packs/day: 0.25     Years: 15.00     Additional pack years: 0.00     Total pack years: 3.75     Types: Cigarettes    Smokeless tobacco: Never   Vaping Use    Vaping Use: Never used   Substance and Sexual Activity    Alcohol use: Yes     Alcohol/week: 8.0  standard drinks of alcohol     Types: 8 Glasses of wine per week    Drug use: Never    Sexual activity: Yes     Partners: Male     Birth control/protection: Male Sterilization   Other Topics Concern    Not on file   Social History Narrative    Not on file     Social Determinants of Health     Financial Resource Strain: Not on file   Food Insecurity: Not on file   Transportation Needs: Not on file   Physical Activity: Not on file   Stress: Not on file   Social Connections: Not on file   Intimate Partner Violence: Not on file   Housing Stability: Not on file       Objective    Physical Exam:  There were no vitals taken for this visit.  General : alert and oriented X3, no acute distress  Eyes : EOMI     Assessment/Plan   Che Arcos is a 44 y.o. female with a hx of low vit D, kidney stones, left thyroid nodule, weight gain who presents for weight management and obesity.     1. Weight Management : Reviewed the principles of energy metabolism, caloric intake and expenditure, and rationale for a treatment program.  Also reinforced need for reduced calorie, low fat diet and increased physical activity.     We reviewed the possibility of starting an interdisciplinary lifestyle intervention program involving improvement of the diet, a personalized exercise program, efforts to reduce the stress and the possibility of using appetite suppressant medications in an effort to help with the weight loss process.  The patient expressed interest in the plan.     2. Nutrition : I discussed trying one of the diet approaches we have here in the program : Mediterranean lifestyle, ketosis diet.  Has met with the RD, trying to eat better.    10/30/22: 197.7lb, 89.7kg/m2     4/9/24: 184.9lb, 37.35kg/m2     3. Sleep : Going for an appointment for this soon.     4. Stress : managed, , has three kids, is working nights as a hospital nurse - so a lot of stress.     5. Exercise : trying to walk 5 days a week, for 2 miles   Encouraged  strength training - showed her [employer] videos.     6. Appetite :  took phentermine -- did well with it - but at the time took it for 12 weeks.  Discussed AOM's.  Has h/o kidney stones - so topamax is contraindicated.  Hga1c (10/30/23): 5.1% - normal  --tried phentermine - but took herself off, did not like it.    7. Goal : to add strength training.     Follow up in a group visit.  Radames Lan MD

## 2024-04-09 ENCOUNTER — OFFICE VISIT (OUTPATIENT)
Dept: ENDOCRINOLOGY | Facility: CLINIC | Age: 44
End: 2024-04-09
Payer: COMMERCIAL

## 2024-04-09 VITALS — HEIGHT: 59 IN | BODY MASS INDEX: 37.28 KG/M2 | WEIGHT: 184.9 LBS

## 2024-04-09 DIAGNOSIS — E66.8 CONSTITUTIONAL OBESITY: ICD-10-CM

## 2024-04-09 DIAGNOSIS — Z76.89 ENCOUNTER FOR WEIGHT MANAGEMENT: ICD-10-CM

## 2024-04-09 DIAGNOSIS — E66.9 OBESITY (BMI 35.0-39.9 WITHOUT COMORBIDITY): Primary | ICD-10-CM

## 2024-04-09 PROCEDURE — 3008F BODY MASS INDEX DOCD: CPT | Performed by: INTERNAL MEDICINE

## 2024-04-09 PROCEDURE — 99215 OFFICE O/P EST HI 40 MIN: CPT | Performed by: INTERNAL MEDICINE

## 2024-05-23 ENCOUNTER — PATIENT MESSAGE (OUTPATIENT)
Dept: SLEEP MEDICINE | Facility: CLINIC | Age: 44
End: 2024-05-23
Payer: COMMERCIAL

## 2024-05-23 DIAGNOSIS — R29.818 SUSPECTED SLEEP APNEA: Primary | ICD-10-CM

## 2024-08-27 ENCOUNTER — APPOINTMENT (OUTPATIENT)
Dept: PRIMARY CARE | Facility: CLINIC | Age: 44
End: 2024-08-27
Payer: COMMERCIAL

## 2024-08-27 VITALS
RESPIRATION RATE: 18 BRPM | BODY MASS INDEX: 40.4 KG/M2 | HEART RATE: 73 BPM | DIASTOLIC BLOOD PRESSURE: 76 MMHG | SYSTOLIC BLOOD PRESSURE: 118 MMHG | TEMPERATURE: 98 F | OXYGEN SATURATION: 100 % | WEIGHT: 200 LBS

## 2024-08-27 DIAGNOSIS — Z12.31 BREAST CANCER SCREENING BY MAMMOGRAM: ICD-10-CM

## 2024-08-27 DIAGNOSIS — Z00.00 HEALTHCARE MAINTENANCE: Primary | ICD-10-CM

## 2024-08-27 DIAGNOSIS — E04.1 LEFT THYROID NODULE: ICD-10-CM

## 2024-08-27 DIAGNOSIS — Z12.4 CERVICAL CANCER SCREENING: ICD-10-CM

## 2024-08-27 DIAGNOSIS — Z12.11 SCREENING FOR MALIGNANT NEOPLASM OF COLON: ICD-10-CM

## 2024-08-27 PROBLEM — E66.9 OBESITY (BMI 35.0-39.9 WITHOUT COMORBIDITY): Status: RESOLVED | Noted: 2023-08-11 | Resolved: 2024-08-27

## 2024-08-27 LAB
POC APPEARANCE, URINE: CLEAR
POC BILIRUBIN, URINE: NEGATIVE
POC BLOOD, URINE: NEGATIVE
POC COLOR, URINE: YELLOW
POC GLUCOSE, URINE: NEGATIVE MG/DL
POC KETONES, URINE: NEGATIVE MG/DL
POC LEUKOCYTES, URINE: NEGATIVE
POC NITRITE,URINE: NEGATIVE
POC PH, URINE: 5 PH
POC PROTEIN, URINE: NEGATIVE MG/DL
POC SPECIFIC GRAVITY, URINE: >=1.03
POC UROBILINOGEN, URINE: 0.2 EU/DL

## 2024-08-27 PROCEDURE — 81002 URINALYSIS NONAUTO W/O SCOPE: CPT | Performed by: FAMILY MEDICINE

## 2024-08-27 PROCEDURE — 99396 PREV VISIT EST AGE 40-64: CPT | Performed by: FAMILY MEDICINE

## 2024-08-27 ASSESSMENT — PATIENT HEALTH QUESTIONNAIRE - PHQ9
1. LITTLE INTEREST OR PLEASURE IN DOING THINGS: NOT AT ALL
2. FEELING DOWN, DEPRESSED OR HOPELESS: NOT AT ALL
SUM OF ALL RESPONSES TO PHQ9 QUESTIONS 1 AND 2: 0

## 2024-08-27 NOTE — PROGRESS NOTES
Subjective   Patient ID: Che Arcos is a 44 y.o. female who presents for Annual Exam.    Dentist and Eye Doctor appointments: up to date with dentist, hasn't seen eye doctor in 20 years  Immunizations: up to date  Diet: Well balanced  Exercise: Not active  Supplements: none  Menstrual Cycles: LMP , regular  Sexually Active: Yes  Pregnancy History: 3 - 1 vaginal, 2   Cancer Screening:    Cervical: - wnl, negative hpv   Breast: last emre normal - 2023   Colon: DUE    Skin: doesn't go to derm, but wears sunscreen   DEXA: N/a        Pt is here for an annual screen. 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 Pt has no issues to address today but states that she might need repeat imaging on her thyroid nodule ( diagnosed 4 years ago). Pt reports no symptom with her nodule and doenst notice a change in size.          Review of Systems    Objective   /76   Pulse 73   Temp 36.7 °C (98 °F)   Resp 18   Wt 90.7 kg (200 lb)   LMP 08/15/2024 Comment: last pap 2022 PCP  SpO2 100%   BMI 40.40 kg/m²     Physical Exam  Constitutional:       General: She is not in acute distress.     Appearance: She is well-developed. She is not diaphoretic.   HENT:      Head: Normocephalic and atraumatic.      Right Ear: Tympanic membrane normal.      Left Ear: Tympanic membrane normal.      Nose: Nose normal.      Mouth/Throat:      Mouth: Mucous membranes are moist.   Eyes:      General: No scleral icterus.     Pupils: Pupils are equal, round, and reactive to light.   Neck:      Thyroid: No thyromegaly.      Vascular: No JVD.   Cardiovascular:      Rate and Rhythm: Normal rate and regular rhythm.      Heart sounds: Normal heart sounds. No murmur heard.     No friction rub. No gallop.   Pulmonary:      Effort: Pulmonary effort is normal. No respiratory distress.      Breath sounds: Normal breath sounds. No wheezing or rales.   Chest:      Chest wall: No tenderness.   Breasts:     Right: Normal.      Left:  Normal.   Abdominal:      General: Bowel sounds are normal. There is no distension.      Palpations: Abdomen is soft. There is no mass.      Tenderness: There is no abdominal tenderness. There is no rebound.   Musculoskeletal:         General: Normal range of motion.      Cervical back: Normal range of motion and neck supple.   Lymphadenopathy:      Cervical: No cervical adenopathy.   Skin:     General: Skin is warm and dry.   Neurological:      General: No focal deficit present.      Mental Status: She is alert and oriented to person, place, and time.      Deep Tendon Reflexes: Reflexes normal.   Psychiatric:         Mood and Affect: Mood normal.         Behavior: Behavior normal.         Assessment/Plan   Diagnoses and all orders for this visit:  Healthcare maintenance  -     POCT UA (nonautomated) manually resulted  -     CBC; Future  -     Comprehensive Metabolic Panel; Future  -     Lipid Panel; Future  -     Vitamin D 25 hydroxy; Future  -     TSH with reflex to Free T4 if abnormal; Future  Cervical cancer screening  Comments:  2022 - wnl, negative hpv  Breast cancer screening by mammogram  -     BI mammo bilateral screening tomosynthesis; Future  Screening for malignant neoplasm of colon  -     Cologuard® colon cancer screening; Future  Left thyroid nodule  -     US thyroid; Future

## 2024-08-27 NOTE — PROGRESS NOTES
Subjective   Patient ID: Che Arcos is a 44 y.o. female who presents for Annual Exam.    Dentist and Eye Doctor appointments:   Immunizations:   Diet:   Exercise:   Supplements:   Menstrual Cycles:  Sexually Active:  Pregnancy History:  Cancer Screening:    Cervical:    Breast:   Colon:    Skin:   DEXA:        HPI     Review of Systems    Objective   /76   Pulse 73   Temp 36.7 °C (98 °F)   Resp 18   Wt 90.7 kg (200 lb)   LMP 08/15/2024 Comment: last pap 5/2022 PCP  SpO2 100%   BMI 40.40 kg/m²     Physical Exam    Assessment/Plan

## 2024-08-27 NOTE — PATIENT INSTRUCTIONS
Today we performed your Annual Physical Exam.  Your preventative health care, labs and vaccinations have been reviewed and are up to date.      Your vaccines are up to date.     For your history of a thyroid nodule I have reordered your thyroid ultrasound     Follow up in 1 year for your Complete Physical Exam

## 2024-09-30 LAB — NONINV COLON CA DNA+OCC BLD SCRN STL QL: NEGATIVE

## 2025-09-04 ENCOUNTER — APPOINTMENT (OUTPATIENT)
Dept: PRIMARY CARE | Facility: CLINIC | Age: 45
End: 2025-09-04
Payer: COMMERCIAL

## 2025-09-04 PROBLEM — Z71.3 ENCOUNTER FOR WEIGHT LOSS COUNSELING: Status: ACTIVE | Noted: 2025-09-04

## 2025-09-04 PROBLEM — Z12.31 ENCOUNTER FOR SCREENING MAMMOGRAM FOR MALIGNANT NEOPLASM OF BREAST: Status: ACTIVE | Noted: 2025-09-04

## 2025-09-04 PROBLEM — E55.9 VITAMIN D DEFICIENCY: Status: ACTIVE | Noted: 2025-09-04

## 2025-09-04 PROBLEM — E66.09 OBESITY DUE TO EXCESS CALORIES: Status: ACTIVE | Noted: 2025-09-04

## 2025-09-04 PROBLEM — Z13.1 DIABETES MELLITUS SCREENING: Status: ACTIVE | Noted: 2025-09-04

## 2025-09-04 PROBLEM — Z13.220 LIPID SCREENING: Status: ACTIVE | Noted: 2025-09-04

## 2025-09-04 PROBLEM — F51.01 PRIMARY INSOMNIA: Status: ACTIVE | Noted: 2025-09-04

## 2025-09-04 PROBLEM — Z00.00 HEALTH MAINTENANCE EXAMINATION: Status: ACTIVE | Noted: 2025-09-04

## 2025-10-08 ENCOUNTER — APPOINTMENT (OUTPATIENT)
Dept: PRIMARY CARE | Facility: CLINIC | Age: 45
End: 2025-10-08
Payer: COMMERCIAL

## (undated) DEVICE — SUTURE, VICRYL, 3-0, 27 IN, SH

## (undated) DEVICE — SOLUTION, GENTAMYCIN 80 MG

## (undated) DEVICE — APPLICATOR, CHLORAPREP, W/ORANGE TINT, 26ML

## (undated) DEVICE — COVER, MAYO STAND, W/PAD, 23 IN, DISPOSABLE, PLASTIC, LF, STERILE

## (undated) DEVICE — STRIP, SKIN CLOSURE, STERI STRIP, REINFORCED, 0.5 X 4 IN

## (undated) DEVICE — DRAIN, JP CHANNEL, 15 FR, RND, W/TROCAR

## (undated) DEVICE — MARKER, SKIN, DUAL TIP, W/RULER AND LABEL

## (undated) DEVICE — SOLUTION, IRRIGATION, STERILE WATER, 1000 ML, POUR BOTTLE

## (undated) DEVICE — COVER, PROBE, TRANSDUCER, PROTECTOR, STERILE

## (undated) DEVICE — SUPPORT, MAMMARY, THERAPEUTIC, SURGI-BRA, LARGE, LF

## (undated) DEVICE — SUTURE, ETHILON, 3-0, 18 IN, PS2, BLACK

## (undated) DEVICE — ADHESIVE, SKIN, MASTISOL, 2/3 CC VIAL

## (undated) DEVICE — SOLUTION, IRRIGATION, SODIUM CHLORIDE 0.9%, 1000 ML, POUR BOTTLE

## (undated) DEVICE — DRESSING, TRANSPARENT, TEGADERM, 4 X 4-3/4 IN

## (undated) DEVICE — Device

## (undated) DEVICE — GLOVE, SURGICAL, PROTEXIS PI , 5.5, PF, LF

## (undated) DEVICE — SUTURE, VICRYL, 2-0, 27 IN, SH, UNDYED